# Patient Record
Sex: FEMALE | Race: BLACK OR AFRICAN AMERICAN | NOT HISPANIC OR LATINO | RURAL
[De-identification: names, ages, dates, MRNs, and addresses within clinical notes are randomized per-mention and may not be internally consistent; named-entity substitution may affect disease eponyms.]

---

## 2023-06-28 ENCOUNTER — OFFICE VISIT (OUTPATIENT)
Dept: FAMILY MEDICINE | Facility: CLINIC | Age: 38
End: 2023-06-28
Payer: COMMERCIAL

## 2023-06-28 VITALS
HEART RATE: 104 BPM | WEIGHT: 293 LBS | BODY MASS INDEX: 44.41 KG/M2 | SYSTOLIC BLOOD PRESSURE: 124 MMHG | TEMPERATURE: 99 F | DIASTOLIC BLOOD PRESSURE: 84 MMHG | RESPIRATION RATE: 18 BRPM | OXYGEN SATURATION: 98 % | HEIGHT: 68 IN

## 2023-06-28 DIAGNOSIS — G89.29 CHRONIC PAIN OF LEFT KNEE: ICD-10-CM

## 2023-06-28 DIAGNOSIS — M25.562 CHRONIC PAIN OF LEFT KNEE: ICD-10-CM

## 2023-06-28 DIAGNOSIS — Z13.1 SCREENING FOR DIABETES MELLITUS: ICD-10-CM

## 2023-06-28 DIAGNOSIS — F32.A DEPRESSION, UNSPECIFIED DEPRESSION TYPE: ICD-10-CM

## 2023-06-28 DIAGNOSIS — F41.9 ANXIETY: Primary | ICD-10-CM

## 2023-06-28 LAB — GLUCOSE SERPL-MCNC: 137 MG/DL (ref 70–110)

## 2023-06-28 PROCEDURE — 1159F PR MEDICATION LIST DOCUMENTED IN MEDICAL RECORD: ICD-10-PCS | Mod: ,,, | Performed by: NURSE PRACTITIONER

## 2023-06-28 PROCEDURE — 1160F RVW MEDS BY RX/DR IN RCRD: CPT | Mod: ,,, | Performed by: NURSE PRACTITIONER

## 2023-06-28 PROCEDURE — 1160F PR REVIEW ALL MEDS BY PRESCRIBER/CLIN PHARMACIST DOCUMENTED: ICD-10-PCS | Mod: ,,, | Performed by: NURSE PRACTITIONER

## 2023-06-28 PROCEDURE — 1159F MED LIST DOCD IN RCRD: CPT | Mod: ,,, | Performed by: NURSE PRACTITIONER

## 2023-06-28 PROCEDURE — 3008F PR BODY MASS INDEX (BMI) DOCUMENTED: ICD-10-PCS | Mod: ,,, | Performed by: NURSE PRACTITIONER

## 2023-06-28 PROCEDURE — 99203 OFFICE O/P NEW LOW 30 MIN: CPT | Mod: ,,, | Performed by: NURSE PRACTITIONER

## 2023-06-28 PROCEDURE — 3008F BODY MASS INDEX DOCD: CPT | Mod: ,,, | Performed by: NURSE PRACTITIONER

## 2023-06-28 PROCEDURE — 3079F DIAST BP 80-89 MM HG: CPT | Mod: ,,, | Performed by: NURSE PRACTITIONER

## 2023-06-28 PROCEDURE — 3074F PR MOST RECENT SYSTOLIC BLOOD PRESSURE < 130 MM HG: ICD-10-PCS | Mod: ,,, | Performed by: NURSE PRACTITIONER

## 2023-06-28 PROCEDURE — 3074F SYST BP LT 130 MM HG: CPT | Mod: ,,, | Performed by: NURSE PRACTITIONER

## 2023-06-28 PROCEDURE — 99203 PR OFFICE/OUTPT VISIT, NEW, LEVL III, 30-44 MIN: ICD-10-PCS | Mod: ,,, | Performed by: NURSE PRACTITIONER

## 2023-06-28 PROCEDURE — 3079F PR MOST RECENT DIASTOLIC BLOOD PRESSURE 80-89 MM HG: ICD-10-PCS | Mod: ,,, | Performed by: NURSE PRACTITIONER

## 2023-06-28 RX ORDER — HYDROXYZINE PAMOATE 25 MG/1
25 CAPSULE ORAL EVERY 8 HOURS PRN
Qty: 30 CAPSULE | Refills: 1 | Status: ON HOLD | OUTPATIENT
Start: 2023-06-28 | End: 2023-12-06 | Stop reason: ALTCHOICE

## 2023-06-28 RX ORDER — MELOXICAM 15 MG/1
15 TABLET ORAL DAILY PRN
Qty: 30 TABLET | Refills: 5 | Status: ON HOLD | OUTPATIENT
Start: 2023-06-28 | End: 2023-12-06 | Stop reason: ALTCHOICE

## 2023-06-28 NOTE — PROGRESS NOTES
Clinic Note    Daja Alfaro is a 38 y.o. female     Chief Complaint:   Chief Complaint   Patient presents with    Foot Swelling     Bilateral Feet swelling. Started Saturday.     Dizziness     Concerned about possible high blood pressure and wants to be checked for diabetes.     Knee Pain     Left knee pain 9/10.     Hand Pain     Right hand pain 7/10    Weight Loss        Subjective:    Patient has a few concerns today.  Patient reports this past week she was at a wedding out of town and noticed swelling to bilateral feet. Admits to being outside and up on feet. Admits to eating pork ribs. Patient states she felt dizzy at times at wedding. Dizziness would come and go. States resolved now. Patient states she took her mom's lasix one day and swelling resolved.   Patient complains of chronic pain to left knee. Denies acute injury. Denies significant swelling.  Patient states she would like to be screened for diabetes. Admits to strong family hx of diabetes. Admits to obesity. Denies eating abundance of food. States she snacks more so. Admits to exercise 2 days a week-walking.   Patient complains of anxiety and depression. Denies suicidal or homicidal ideation. Patient admits to a lot of deaths. Admits to grieving. Has lost 2 sibling and close friend. Admits to crying and worrying. Admits to difficulty sleeping. Patient agreeable to therapy.          Allergies:   Review of patient's allergies indicates:  No Known Allergies     Past Medical History:  History reviewed. No pertinent past medical history.     Current Medications:    Current Outpatient Medications:     mv-min/iron/folic/calcium/vitK (WOMEN'S MULTIVITAMIN ORAL), Take by mouth., Disp: , Rfl:     hydrOXYzine pamoate (VISTARIL) 25 MG Cap, Take 1 capsule (25 mg total) by mouth every 8 (eight) hours as needed., Disp: 30 capsule, Rfl: 1    meloxicam (MOBIC) 15 MG tablet, Take 1 tablet (15 mg total) by mouth daily as needed for Pain., Disp: 30 tablet, Rfl: 5  "      Review of Systems   Constitutional:  Negative for fever.   Respiratory:  Negative for cough and shortness of breath.    Cardiovascular:  Positive for leg swelling. Negative for chest pain.   Gastrointestinal:  Negative for abdominal pain, nausea and vomiting.   Endocrine: Negative for polydipsia, polyphagia and polyuria.   Genitourinary:  Negative for dysuria.   Musculoskeletal:  Positive for arthralgias. Negative for joint swelling.   Neurological:  Negative for headaches.   Psychiatric/Behavioral:  Positive for dysphoric mood and sleep disturbance. Negative for self-injury and suicidal ideas. The patient is nervous/anxious.         Objective:    /84 (BP Location: Left arm, Patient Position: Sitting, BP Method: Large (Automatic))   Pulse 104   Temp 99.2 °F (37.3 °C) (Oral)   Resp 18   Ht 5' 8" (1.727 m)   Wt (!) 145.2 kg (320 lb)   LMP 06/23/2023 (Exact Date)   SpO2 98%   BMI 48.66 kg/m²      Physical Exam  Constitutional:       Appearance: She is obese.   Eyes:      Extraocular Movements: Extraocular movements intact.   Cardiovascular:      Rate and Rhythm: Normal rate and regular rhythm.      Pulses: Normal pulses.      Heart sounds: Normal heart sounds.   Pulmonary:      Effort: Pulmonary effort is normal.      Breath sounds: Normal breath sounds.   Abdominal:      Palpations: Abdomen is soft.      Tenderness: There is no abdominal tenderness.   Musculoskeletal:      Left knee: No swelling. Normal range of motion. Tenderness present. No medial joint line tenderness.      Right lower leg: No edema.      Left lower leg: No edema.        Legs:    Skin:     General: Skin is warm and dry.   Neurological:      Mental Status: She is alert.   Psychiatric:         Mood and Affect: Affect is tearful.         Behavior: Behavior is cooperative.        Assessment and Plan:    1. Anxiety    2. Depression, unspecified depression type    3. Screening for diabetes mellitus    4. Chronic pain of left knee     "     Anxiety  -     hydrOXYzine pamoate (VISTARIL) 25 MG Cap; Take 1 capsule (25 mg total) by mouth every 8 (eight) hours as needed.  Dispense: 30 capsule; Refill: 1  -     Ambulatory referral/consult to Psychology; Future; Expected date: 07/05/2023  -discussed side effects and precautions  -refer to therapy. Patient agreeable    Depression, unspecified depression type  -     hydrOXYzine pamoate (VISTARIL) 25 MG Cap; Take 1 capsule (25 mg total) by mouth every 8 (eight) hours as needed.  Dispense: 30 capsule; Refill: 1  -     Ambulatory referral/consult to Psychology; Future; Expected date: 07/05/2023    Screening for diabetes mellitus  -     POCT glucose  -glucose 137 nonfasting. Instructed patient high side of normal.   -patient has follow up with pcp July 17th. Recommend hgba1c due to family hx and glucose    Chronic pain of left knee  -     meloxicam (MOBIC) 15 MG tablet; Take 1 tablet (15 mg total) by mouth daily as needed for Pain.  Dispense: 30 tablet; Refill: 5  -try for knee pain prn  -f/u if persist or worsen  -recommend weight loss        There are no Patient Instructions on file for this visit.   Follow up if symptoms worsen or fail to improve.

## 2023-06-28 NOTE — LETTER
June 28, 2023      [facility]  [address] [place] [address]  Phone: [phone]  Fax: [phone]       Patient: Daja Alfaro   YOB: 1985  Date of Visit: 06/28/2023    To Whom It May Concern:    Mckenzie Alfaro  was at [facility] on 06/28/2023. The patient may return to work/school on 06/29/2023. If you have any questions or concerns, or if I can be of further assistance, please do not hesitate to contact me.    Sincerely,    Sushant Wolf MA

## 2023-10-17 ENCOUNTER — OFFICE VISIT (OUTPATIENT)
Dept: OBSTETRICS AND GYNECOLOGY | Facility: CLINIC | Age: 38
End: 2023-10-17
Payer: COMMERCIAL

## 2023-10-17 VITALS
HEART RATE: 89 BPM | DIASTOLIC BLOOD PRESSURE: 86 MMHG | SYSTOLIC BLOOD PRESSURE: 134 MMHG | WEIGHT: 293 LBS | BODY MASS INDEX: 47.47 KG/M2

## 2023-10-17 DIAGNOSIS — R10.2 PELVIC PAIN: Primary | ICD-10-CM

## 2023-10-17 DIAGNOSIS — N85.2 ENLARGED UTERUS: ICD-10-CM

## 2023-10-17 DIAGNOSIS — N92.0 MENORRHAGIA WITH REGULAR CYCLE: ICD-10-CM

## 2023-10-17 DIAGNOSIS — N83.201 CYST OF RIGHT OVARY: ICD-10-CM

## 2023-10-17 LAB
BASOPHILS # BLD AUTO: 0.03 K/UL (ref 0–0.2)
BASOPHILS NFR BLD AUTO: 0.3 % (ref 0–1)
DIFFERENTIAL METHOD BLD: ABNORMAL
EOSINOPHIL # BLD AUTO: 0.05 K/UL (ref 0–0.5)
EOSINOPHIL NFR BLD AUTO: 0.5 % (ref 1–4)
ERYTHROCYTE [DISTWIDTH] IN BLOOD BY AUTOMATED COUNT: 16.1 % (ref 11.5–14.5)
FSH SERPL-ACNC: 2 MIU/ML (ref 1.7–134.8)
HCT VFR BLD AUTO: 39.7 % (ref 38–47)
HGB BLD-MCNC: 12.2 G/DL (ref 12–16)
IMM GRANULOCYTES # BLD AUTO: 0.02 K/UL (ref 0–0.04)
IMM GRANULOCYTES NFR BLD: 0.2 % (ref 0–0.4)
LH SERPL-ACNC: 0.9 MIU/ML
LYMPHOCYTES # BLD AUTO: 2.3 K/UL (ref 1–4.8)
LYMPHOCYTES NFR BLD AUTO: 23.9 % (ref 27–41)
MCH RBC QN AUTO: 24.8 PG (ref 27–31)
MCHC RBC AUTO-ENTMCNC: 30.7 G/DL (ref 32–36)
MCV RBC AUTO: 80.9 FL (ref 80–96)
MONOCYTES # BLD AUTO: 0.43 K/UL (ref 0–0.8)
MONOCYTES NFR BLD AUTO: 4.5 % (ref 2–6)
MPC BLD CALC-MCNC: 10.1 FL (ref 9.4–12.4)
NEUTROPHILS # BLD AUTO: 6.8 K/UL (ref 1.8–7.7)
NEUTROPHILS NFR BLD AUTO: 70.6 % (ref 53–65)
NRBC # BLD AUTO: 0 X10E3/UL
NRBC, AUTO (.00): 0 %
PLATELET # BLD AUTO: 319 K/UL (ref 150–400)
RBC # BLD AUTO: 4.91 M/UL (ref 4.2–5.4)
TESTOST SERPL-MCNC: 10 NG/DL (ref 8–60)
TSH SERPL DL<=0.005 MIU/L-ACNC: 0.7 UIU/ML (ref 0.36–3.74)
WBC # BLD AUTO: 9.63 K/UL (ref 4.5–11)

## 2023-10-17 PROCEDURE — 1159F MED LIST DOCD IN RCRD: CPT | Mod: ,,, | Performed by: OBSTETRICS & GYNECOLOGY

## 2023-10-17 PROCEDURE — 3079F PR MOST RECENT DIASTOLIC BLOOD PRESSURE 80-89 MM HG: ICD-10-PCS | Mod: ,,, | Performed by: OBSTETRICS & GYNECOLOGY

## 2023-10-17 PROCEDURE — 3079F DIAST BP 80-89 MM HG: CPT | Mod: ,,, | Performed by: OBSTETRICS & GYNECOLOGY

## 2023-10-17 PROCEDURE — 84443 TSH: ICD-10-PCS | Mod: ,,, | Performed by: CLINICAL MEDICAL LABORATORY

## 2023-10-17 PROCEDURE — 99203 OFFICE O/P NEW LOW 30 MIN: CPT | Mod: ,,, | Performed by: OBSTETRICS & GYNECOLOGY

## 2023-10-17 PROCEDURE — 85025 COMPLETE CBC W/AUTO DIFF WBC: CPT | Mod: ,,, | Performed by: CLINICAL MEDICAL LABORATORY

## 2023-10-17 PROCEDURE — 83002 LUTEINIZING HORMONE: ICD-10-PCS | Mod: ,,, | Performed by: CLINICAL MEDICAL LABORATORY

## 2023-10-17 PROCEDURE — 83001 ASSAY OF GONADOTROPIN (FSH): CPT | Mod: ,,, | Performed by: CLINICAL MEDICAL LABORATORY

## 2023-10-17 PROCEDURE — 3075F SYST BP GE 130 - 139MM HG: CPT | Mod: ,,, | Performed by: OBSTETRICS & GYNECOLOGY

## 2023-10-17 PROCEDURE — 83001 FOLLICLE STIMULATING HORMONE: ICD-10-PCS | Mod: ,,, | Performed by: CLINICAL MEDICAL LABORATORY

## 2023-10-17 PROCEDURE — 3008F BODY MASS INDEX DOCD: CPT | Mod: ,,, | Performed by: OBSTETRICS & GYNECOLOGY

## 2023-10-17 PROCEDURE — 36415 COLL VENOUS BLD VENIPUNCTURE: CPT | Mod: ,,, | Performed by: OBSTETRICS & GYNECOLOGY

## 2023-10-17 PROCEDURE — 99203 PR OFFICE/OUTPT VISIT, NEW, LEVL III, 30-44 MIN: ICD-10-PCS | Mod: ,,, | Performed by: OBSTETRICS & GYNECOLOGY

## 2023-10-17 PROCEDURE — 84443 ASSAY THYROID STIM HORMONE: CPT | Mod: ,,, | Performed by: CLINICAL MEDICAL LABORATORY

## 2023-10-17 PROCEDURE — 3075F PR MOST RECENT SYSTOLIC BLOOD PRESS GE 130-139MM HG: ICD-10-PCS | Mod: ,,, | Performed by: OBSTETRICS & GYNECOLOGY

## 2023-10-17 PROCEDURE — 84403 ASSAY OF TOTAL TESTOSTERONE: CPT | Mod: ,,, | Performed by: CLINICAL MEDICAL LABORATORY

## 2023-10-17 PROCEDURE — 83002 ASSAY OF GONADOTROPIN (LH): CPT | Mod: ,,, | Performed by: CLINICAL MEDICAL LABORATORY

## 2023-10-17 PROCEDURE — 1160F PR REVIEW ALL MEDS BY PRESCRIBER/CLIN PHARMACIST DOCUMENTED: ICD-10-PCS | Mod: ,,, | Performed by: OBSTETRICS & GYNECOLOGY

## 2023-10-17 PROCEDURE — 85025 CBC WITH DIFFERENTIAL: ICD-10-PCS | Mod: ,,, | Performed by: CLINICAL MEDICAL LABORATORY

## 2023-10-17 PROCEDURE — 36415 PR COLLECTION VENOUS BLOOD,VENIPUNCTURE: ICD-10-PCS | Mod: ,,, | Performed by: OBSTETRICS & GYNECOLOGY

## 2023-10-17 PROCEDURE — 1159F PR MEDICATION LIST DOCUMENTED IN MEDICAL RECORD: ICD-10-PCS | Mod: ,,, | Performed by: OBSTETRICS & GYNECOLOGY

## 2023-10-17 PROCEDURE — 3008F PR BODY MASS INDEX (BMI) DOCUMENTED: ICD-10-PCS | Mod: ,,, | Performed by: OBSTETRICS & GYNECOLOGY

## 2023-10-17 PROCEDURE — 1160F RVW MEDS BY RX/DR IN RCRD: CPT | Mod: ,,, | Performed by: OBSTETRICS & GYNECOLOGY

## 2023-10-17 PROCEDURE — 84403 TESTOSTERONE: ICD-10-PCS | Mod: ,,, | Performed by: CLINICAL MEDICAL LABORATORY

## 2023-10-17 RX ORDER — FERROUS SULFATE TAB 325 MG (65 MG ELEMENTAL FE) 325 (65 FE) MG
TAB ORAL 2 TIMES DAILY
COMMUNITY
Start: 2023-08-25

## 2023-10-17 RX ORDER — PREDNISONE 10 MG/1
10 TABLET ORAL
Status: ON HOLD | COMMUNITY
Start: 2023-10-06 | End: 2023-12-06 | Stop reason: ALTCHOICE

## 2023-10-17 NOTE — PROGRESS NOTES
History & Physical    SUBJECTIVE:     History of Present Illness:  Patient is a 38 y.o. female presents with a referral from  due to an ovarian cyst. Onset of symptoms was gradual starting 1 year ago with unchanged course since that time. Patient denies pelvic pain with intercourse. Symptoms are aggravated by cycles. Symptoms improve with taking Aleve. Pt states she is having right sided pelvic pain and rates pain a 9-10 on a 0-10 scale. Reviewed US from [facility]. Cycles last 3-5 days and uses 6 pads or tampons in a day when on her cycle.  Pt has been on birth control in the past without resolution or improvement of her symptoms.  Chief Complaint   Patient presents with    Ovarian Cyst     New Patient       Review of patient's allergies indicates:  No Known Allergies    Current Outpatient Medications   Medication Sig Dispense Refill    FEROSUL 325 mg (65 mg iron) Tab tablet Take by mouth 3 (three) times daily.      mv-min/iron/folic/calcium/vitK (WOMEN'S MULTIVITAMIN ORAL) Take by mouth.      predniSONE (DELTASONE) 10 MG tablet Take 10 mg by mouth.      hydrOXYzine pamoate (VISTARIL) 25 MG Cap Take 1 capsule (25 mg total) by mouth every 8 (eight) hours as needed. (Patient not taking: Reported on 10/17/2023) 30 capsule 1    meloxicam (MOBIC) 15 MG tablet Take 1 tablet (15 mg total) by mouth daily as needed for Pain. (Patient not taking: Reported on 10/17/2023) 30 tablet 5     No current facility-administered medications for this visit.       History reviewed. No pertinent past medical history.  Past Surgical History:   Procedure Laterality Date    EXTERNAL EAR SURGERY Right     HAND SURGERY Right     TUBAL LIGATION       Family History   Problem Relation Age of Onset    Diabetes Mellitus Mother     Diabetes Mellitus Father     Hypertension Maternal Grandfather     Diabetes Paternal Grandmother     Hypertension Paternal Grandfather      Social History     Tobacco Use    Smoking status: Former     Types: Cigars      Passive exposure: Current    Smokeless tobacco: Never   Substance Use Topics    Alcohol use: Yes     Alcohol/week: 2.0 standard drinks of alcohol     Types: 2 Glasses of wine per week    Drug use: Not Currently        Review of Systems:  Review of Systems   Constitutional:  Negative for appetite change, chills, fatigue and fever.   HENT: Negative.     Eyes: Negative.    Respiratory:  Negative for cough, chest tightness and shortness of breath.    Cardiovascular:  Negative for chest pain, palpitations and leg swelling.   Gastrointestinal:  Negative for abdominal distention, abdominal pain, blood in stool, constipation, diarrhea, nausea and vomiting.   Endocrine: Negative for cold intolerance, heat intolerance, polydipsia, polyphagia and polyuria.   Genitourinary:  Positive for menstrual problem (menorrhagia) and pelvic pain. Negative for difficulty urinating, dyspareunia, dysuria, flank pain, frequency, urgency, vaginal bleeding, vaginal discharge and vaginal pain.   Musculoskeletal: Negative.    Skin: Negative.    Neurological: Negative.    Psychiatric/Behavioral: Negative.  Negative for agitation, behavioral problems, confusion and sleep disturbance. The patient is not nervous/anxious.        OBJECTIVE:     Vital Signs (Most Recent)  Pulse: 89 (10/17/23 1045)  BP: 134/86 (10/17/23 1045)     (!) 141.6 kg (312 lb 3.2 oz)     Physical Exam:  Physical Exam  Vitals reviewed. Exam conducted with a chaperone present.   Constitutional:       Appearance: Normal appearance.   HENT:      Head: Normocephalic and atraumatic.      Mouth/Throat:      Mouth: Mucous membranes are moist.   Eyes:      Extraocular Movements: Extraocular movements intact.      Pupils: Pupils are equal, round, and reactive to light.   Cardiovascular:      Rate and Rhythm: Normal rate and regular rhythm.      Pulses: Normal pulses.      Heart sounds: Normal heart sounds.   Pulmonary:      Effort: Pulmonary effort is normal.      Breath sounds: Normal  breath sounds.   Abdominal:      General: Abdomen is flat. Bowel sounds are normal.      Palpations: Abdomen is soft.   Musculoskeletal:         General: Normal range of motion.      Cervical back: Normal range of motion.   Skin:     General: Skin is warm and dry.   Neurological:      General: No focal deficit present.      Mental Status: She is alert and oriented to person, place, and time.   Psychiatric:         Mood and Affect: Mood normal.         Behavior: Behavior normal.         Thought Content: Thought content normal.         Judgment: Judgment normal.           Diagnostic Results:  Labs: Reviewed  US: Reviewed       ASSESSMENT/PLAN:   Daja was seen today for ovarian cyst.    Diagnoses and all orders for this visit:    Pelvic pain  -     Case Request Operating Room: HYSTERECTOMY,TOTAL,LAPAROSCOPIC,WITH SALPINGO-OOPHORECTOMY    Enlarged uterus  -     Case Request Operating Room: HYSTERECTOMY,TOTAL,LAPAROSCOPIC,WITH SALPINGO-OOPHORECTOMY    Menorrhagia with regular cycle  -     Follicle Stimulating Hormone; Future  -     Luteinizing Hormone; Future  -     TSH; Future  -     CBC Auto Differential; Future  -     Testosterone; Future  -     Case Request Operating Room: HYSTERECTOMY,TOTAL,LAPAROSCOPIC,WITH SALPINGO-OOPHORECTOMY  -     Follicle Stimulating Hormone  -     Luteinizing Hormone  -     TSH  -     CBC Auto Differential  -     Testosterone    Cyst of right ovary  -     Case Request Operating Room: HYSTERECTOMY,TOTAL,LAPAROSCOPIC,WITH SALPINGO-OOPHORECTOMY          PLAN:Plan   Treatment options reviewed with the pt both conservative and interventional. Pt has been on birth control in the past without improvement.    Pt wishes to proceed with hysterectomy with right oophorectomy on 12/06/2023.

## 2023-10-25 ENCOUNTER — TELEPHONE (OUTPATIENT)
Dept: OBSTETRICS AND GYNECOLOGY | Facility: CLINIC | Age: 38
End: 2023-10-25
Payer: COMMERCIAL

## 2023-10-25 NOTE — LETTER
October 25, 2023      [facility]  [address] [employer] [address]  Phone: [phone]  Fax: [phone]       Patient: Daja Alfaro   YOB: 1985  Date of Visit: 10/25/2023    To Whom It May Concern:    Mckenzie Alfaro  was at [facility] and is scheduled to have surgery on 12/06/2023. The patient will have an appointment on 12/04/2023 and may return to work/school on 01/17/2024 with no restrictions. If you have any questions or concerns, or if I can be of further assistance, please do not hesitate to contact me.    Sincerely,    Patsy Savage LPN

## 2023-10-25 NOTE — TELEPHONE ENCOUNTER
----- Message from Kourtney Wallace MA sent at 10/23/2023  8:40 AM CDT -----  Letter needs to be sent to [facility]-   Attn: Luli    She needs letter stating she is having surgery and how long to be out before they give her fmla paper  Call [phone] office #    Fax: [phone]

## 2023-11-03 NOTE — TELEPHONE ENCOUNTER
Attempted to contact to to verbalize request/ Pt has an appt on 11/08/2023 and letter can be given then. Letter was placed in [employer]

## 2023-11-08 ENCOUNTER — PROCEDURE VISIT (OUTPATIENT)
Dept: OBSTETRICS AND GYNECOLOGY | Facility: CLINIC | Age: 38
End: 2023-11-08
Payer: COMMERCIAL

## 2023-11-08 VITALS
DIASTOLIC BLOOD PRESSURE: 84 MMHG | BODY MASS INDEX: 47.87 KG/M2 | WEIGHT: 293 LBS | HEART RATE: 84 BPM | SYSTOLIC BLOOD PRESSURE: 122 MMHG

## 2023-11-08 DIAGNOSIS — Z01.419 ROUTINE GYNECOLOGICAL EXAMINATION: Primary | ICD-10-CM

## 2023-11-08 DIAGNOSIS — N83.201 CYST OF RIGHT OVARY: ICD-10-CM

## 2023-11-08 DIAGNOSIS — N85.2 ENLARGED UTERUS: ICD-10-CM

## 2023-11-08 DIAGNOSIS — Z12.4 CERVICAL CANCER SCREENING: ICD-10-CM

## 2023-11-08 DIAGNOSIS — N92.0 MENORRHAGIA WITH REGULAR CYCLE: ICD-10-CM

## 2023-11-08 LAB
B-HCG UR QL: NEGATIVE
CTP QC/QA: YES

## 2023-11-08 PROCEDURE — 81025 URINE PREGNANCY TEST: CPT | Mod: QW,,, | Performed by: OBSTETRICS & GYNECOLOGY

## 2023-11-08 PROCEDURE — 58558 PR HYSTEROSCOPY,W/ENDO BX: ICD-10-PCS | Mod: ,,, | Performed by: OBSTETRICS & GYNECOLOGY

## 2023-11-08 PROCEDURE — 81025 POCT URINE PREGNANCY: ICD-10-PCS | Mod: QW,,, | Performed by: OBSTETRICS & GYNECOLOGY

## 2023-11-08 PROCEDURE — 88142 CYTOPATH C/V THIN LAYER: CPT | Mod: TC,GCY | Performed by: OBSTETRICS & GYNECOLOGY

## 2023-11-08 PROCEDURE — 58558 HYSTEROSCOPY BIOPSY: CPT | Mod: ,,, | Performed by: OBSTETRICS & GYNECOLOGY

## 2023-11-08 PROCEDURE — 88305 TISSUE EXAM BY PATHOLOGIST: CPT | Mod: TC,SUR | Performed by: OBSTETRICS & GYNECOLOGY

## 2023-11-08 PROCEDURE — 87624 HPV HI-RISK TYP POOLED RSLT: CPT | Mod: ,,, | Performed by: CLINICAL MEDICAL LABORATORY

## 2023-11-08 PROCEDURE — 87624 HUMAN PAPILLOMAVIRUS (HPV): ICD-10-PCS | Mod: ,,, | Performed by: CLINICAL MEDICAL LABORATORY

## 2023-11-08 PROCEDURE — 99395 PR PREVENTIVE VISIT,EST,18-39: ICD-10-PCS | Mod: 25,,, | Performed by: OBSTETRICS & GYNECOLOGY

## 2023-11-08 PROCEDURE — 88305 TISSUE EXAM BY PATHOLOGIST: CPT | Mod: 26,,, | Performed by: PATHOLOGY

## 2023-11-08 PROCEDURE — 99395 PREV VISIT EST AGE 18-39: CPT | Mod: 25,,, | Performed by: OBSTETRICS & GYNECOLOGY

## 2023-11-08 PROCEDURE — 88305 SURGICAL PATHOLOGY: ICD-10-PCS | Mod: 26,,, | Performed by: PATHOLOGY

## 2023-11-08 NOTE — PROCEDURES
Procedures  Hysteorscopy, EMB Procedure Note:    Following informed written consent, the pt was placed in the lithotomy position. The speculum was inserted into the vagina and the cervix visualized with ease. The cervix was cleaned with betadine.  The uterus was sounded to 10 cm with the uterine sound. The hysteroscope was then inserted into the cervical os and the uterine cavity was directly visualized, bilateral ostea were noted at that time. Findings no lesions, polyps or fibroids seen. The hysteroscope was then removed and a biopsy of the endometrium performed with a pipelle. Specimen sent to pathology for evaluation. The single tooth tenaculum was then removed and the cervix was made hemostatic with pressure.

## 2023-11-08 NOTE — H&P
CC: Well woman exam    Daja Alfaro is a 38 y.o. female  presents for well woman exam.    LMP: Patient's last menstrual period was 10/27/2023 (approximate)..    Last mammogram: na  Last Colonoscopy: na  Heavy menses    History reviewed. No pertinent past medical history.  Past Surgical History:   Procedure Laterality Date    EXTERNAL EAR SURGERY Right     HAND SURGERY Right     TUBAL LIGATION       Social History     Socioeconomic History    Marital status: Significant Other   Tobacco Use    Smoking status: Former     Types: Cigars     Passive exposure: Current    Smokeless tobacco: Never   Substance and Sexual Activity    Alcohol use: Yes     Alcohol/week: 2.0 standard drinks of alcohol     Types: 2 Glasses of wine per week    Drug use: Not Currently    Sexual activity: Yes     Family History   Problem Relation Age of Onset    Diabetes Mellitus Mother     Diabetes Mellitus Father     Hypertension Maternal Grandfather     Diabetes Paternal Grandmother     Hypertension Paternal Grandfather      OB History          4    Para        Term                AB        Living   4         SAB        IAB        Ectopic        Multiple        Live Births                     /84   Pulse 84   Wt (!) 142.8 kg (314 lb 12.8 oz)   LMP 10/27/2023 (Approximate)   BMI 47.87 kg/m²       ROS:  GENERAL: Denies weight gain or weight loss. Feeling well overall.   SKIN: Denies rash or lesions.   HEAD: Denies head injury or headache.   NODES: Denies enlarged lymph nodes.   CHEST: Denies chest pain or shortness of breath.   CARDIOVASCULAR: Denies palpitations or left sided chest pain.   ABDOMEN: No abdominal pain, constipation, diarrhea, nausea, vomiting or rectal bleeding.   URINARY: No frequency, dysuria, hematuria, or burning on urination.  REPRODUCTIVE: See HPI.   BREASTS: The patient performs breast self-examination and denies pain, lumps, or nipple discharge.   HEMATOLOGIC: No easy bruisability or excessive  bleeding.   MUSCULOSKELETAL: Denies joint pain or swelling.   NEUROLOGIC: Denies syncope or weakness.   PSYCHIATRIC: Denies depression, anxiety or mood swings.    PHYSICAL EXAM:  APPEARANCE: Well nourished, well developed, in no acute distress.  AFFECT: WNL, alert and oriented x 3  SKIN: No acne or hirsutism  NECK: Neck symmetric without masses or thyromegaly  NODES: No inguinal, cervical, axillary, or femoral lymph node enlargement  CHEST: Good respiratory effect  ABDOMEN: Soft.  No tenderness or masses.  No hepatosplenomegaly.  No hernias.  BREASTS: Symmetrical, no skin changes or visible lesions.  No palpable masses, nipple discharge bilaterally.  PELVIC: Normal external genitalia without lesions.  Normal hair distribution.  Adequate perineal body, normal urethral meatus.  Vagina moist and well rugated without lesions or discharge.  Cervix pink, without lesions, discharge or tenderness.  No significant cystocele or rectocele.  Bimanual exam shows uterus to be normal size, regular, mobile and nontender.  Adnexa without masses or tenderness.    EXTREMITIES: No edema.    Routine gynecological examination  -     ThinPrep Pap Test; Future; Expected date: 11/08/2023    Cervical cancer screening  -     ThinPrep Pap Test; Future; Expected date: 11/08/2023    Menorrhagia with regular cycle  -     POCT urine pregnancy  -     Surgical Pathology    Enlarged uterus    Cyst of right ovary            Patient was counseled today on A.C.S. Pap guidelines and recommendations for yearly pelvic exams, mammograms and monthly self breast exams; to see her PCP for other health maintenance.   Exercise regimen encouraged  Healthy food choices encouraged  Questions answered to desired level of satisfaction  Verbalized understanding to all information and instructions    Follow up in about 1 year (around 11/8/2024) for Annual Exam.      Jeremiah Valdivia M.D., FCOG    OB/GYN

## 2023-11-10 LAB
ESTROGEN SERPL-MCNC: NORMAL PG/ML
INSULIN SERPL-ACNC: NORMAL U[IU]/ML
LAB AP CLINICAL INFORMATION: NORMAL
LAB AP GROSS DESCRIPTION: NORMAL
LAB AP LABORATORY NOTES: NORMAL
T3RU NFR SERPL: NORMAL %

## 2023-11-13 LAB
GH SERPL-MCNC: NORMAL NG/ML
INSULIN SERPL-ACNC: NORMAL U[IU]/ML
LAB AP CLINICAL INFORMATION: NORMAL
LAB AP GYN INTERPRETATION: NEGATIVE
LAB AP PAP DISCLAIMER COMMENTS: NORMAL
RENIN PLAS-CCNC: NORMAL NG/ML/H

## 2023-11-16 LAB
HPV 16: NEGATIVE
HPV 18: NEGATIVE
HPV OTHER: NEGATIVE

## 2023-12-04 ENCOUNTER — OFFICE VISIT (OUTPATIENT)
Dept: OBSTETRICS AND GYNECOLOGY | Facility: CLINIC | Age: 38
End: 2023-12-04
Payer: COMMERCIAL

## 2023-12-04 VITALS
HEART RATE: 105 BPM | WEIGHT: 293 LBS | SYSTOLIC BLOOD PRESSURE: 130 MMHG | DIASTOLIC BLOOD PRESSURE: 85 MMHG | BODY MASS INDEX: 47.5 KG/M2

## 2023-12-04 DIAGNOSIS — R10.2 PELVIC PAIN: ICD-10-CM

## 2023-12-04 DIAGNOSIS — Z01.818 PRE-OP TESTING: ICD-10-CM

## 2023-12-04 DIAGNOSIS — N83.201 CYST OF RIGHT OVARY: ICD-10-CM

## 2023-12-04 DIAGNOSIS — N85.2 ENLARGED UTERUS: ICD-10-CM

## 2023-12-04 DIAGNOSIS — N92.0 MENORRHAGIA WITH REGULAR CYCLE: Primary | ICD-10-CM

## 2023-12-04 LAB
ALBUMIN SERPL BCP-MCNC: 3.6 G/DL (ref 3.5–5)
ALBUMIN/GLOB SERPL: 1.1 {RATIO}
ALP SERPL-CCNC: 65 U/L (ref 37–98)
ALT SERPL W P-5'-P-CCNC: 19 U/L (ref 13–56)
ANION GAP SERPL CALCULATED.3IONS-SCNC: 11 MMOL/L (ref 7–16)
AST SERPL W P-5'-P-CCNC: 14 U/L (ref 15–37)
BACTERIA #/AREA URNS HPF: ABNORMAL /HPF
BASOPHILS # BLD AUTO: 0.03 K/UL (ref 0–0.2)
BASOPHILS NFR BLD AUTO: 0.4 % (ref 0–1)
BILIRUB SERPL-MCNC: 0.6 MG/DL (ref ?–1.2)
BILIRUB UR QL STRIP: NEGATIVE
BUN SERPL-MCNC: 7 MG/DL (ref 7–18)
BUN/CREAT SERPL: 8 (ref 6–20)
CALCIUM SERPL-MCNC: 8.9 MG/DL (ref 8.5–10.1)
CHLORIDE SERPL-SCNC: 106 MMOL/L (ref 98–107)
CLARITY UR: ABNORMAL
CO2 SERPL-SCNC: 28 MMOL/L (ref 21–32)
COLOR UR: ABNORMAL
CREAT SERPL-MCNC: 0.86 MG/DL (ref 0.55–1.02)
DIFFERENTIAL METHOD BLD: ABNORMAL
EGFR (NO RACE VARIABLE) (RUSH/TITUS): 89 ML/MIN/1.73M2
EOSINOPHIL # BLD AUTO: 0.07 K/UL (ref 0–0.5)
EOSINOPHIL NFR BLD AUTO: 0.9 % (ref 1–4)
ERYTHROCYTE [DISTWIDTH] IN BLOOD BY AUTOMATED COUNT: 14.9 % (ref 11.5–14.5)
GLOBULIN SER-MCNC: 3.3 G/DL (ref 2–4)
GLUCOSE SERPL-MCNC: 100 MG/DL (ref 74–106)
GLUCOSE UR STRIP-MCNC: NORMAL MG/DL
HCT VFR BLD AUTO: 43.2 % (ref 38–47)
HGB BLD-MCNC: 13.1 G/DL (ref 12–16)
IMM GRANULOCYTES # BLD AUTO: 0.02 K/UL (ref 0–0.04)
IMM GRANULOCYTES NFR BLD: 0.3 % (ref 0–0.4)
KETONES UR STRIP-SCNC: NEGATIVE MG/DL
LEUKOCYTE ESTERASE UR QL STRIP: ABNORMAL
LYMPHOCYTES # BLD AUTO: 1.68 K/UL (ref 1–4.8)
LYMPHOCYTES NFR BLD AUTO: 21.7 % (ref 27–41)
MCH RBC QN AUTO: 26.4 PG (ref 27–31)
MCHC RBC AUTO-ENTMCNC: 30.3 G/DL (ref 32–36)
MCV RBC AUTO: 87.1 FL (ref 80–96)
MONOCYTES # BLD AUTO: 0.35 K/UL (ref 0–0.8)
MONOCYTES NFR BLD AUTO: 4.5 % (ref 2–6)
MPC BLD CALC-MCNC: 10.5 FL (ref 9.4–12.4)
MUCOUS, UA: ABNORMAL /LPF
NEUTROPHILS # BLD AUTO: 5.6 K/UL (ref 1.8–7.7)
NEUTROPHILS NFR BLD AUTO: 72.2 % (ref 53–65)
NITRITE UR QL STRIP: NEGATIVE
NRBC # BLD AUTO: 0 X10E3/UL
NRBC, AUTO (.00): 0 %
PH UR STRIP: 5.5 PH UNITS
PLATELET # BLD AUTO: 336 K/UL (ref 150–400)
POTASSIUM SERPL-SCNC: 3.6 MMOL/L (ref 3.5–5.1)
PROT SERPL-MCNC: 6.9 G/DL (ref 6.4–8.2)
PROT UR QL STRIP: 10
RBC # BLD AUTO: 4.96 M/UL (ref 4.2–5.4)
RBC # UR STRIP: ABNORMAL /UL
RBC #/AREA URNS HPF: 11 /HPF
SODIUM SERPL-SCNC: 141 MMOL/L (ref 136–145)
SP GR UR STRIP: 1.02
SQUAMOUS #/AREA URNS LPF: ABNORMAL /HPF
UROBILINOGEN UR STRIP-ACNC: NORMAL MG/DL
WBC # BLD AUTO: 7.75 K/UL (ref 4.5–11)
WBC #/AREA URNS HPF: 98 /HPF
WBC CLUMPS, UA: ABNORMAL /HPF
YEAST #/AREA URNS HPF: ABNORMAL /HPF

## 2023-12-04 PROCEDURE — 36415 PR COLLECTION VENOUS BLOOD,VENIPUNCTURE: ICD-10-PCS | Mod: ,,, | Performed by: OBSTETRICS & GYNECOLOGY

## 2023-12-04 PROCEDURE — 86900 BLOOD TYPING SEROLOGIC ABO: CPT | Mod: ,,, | Performed by: CLINICAL MEDICAL LABORATORY

## 2023-12-04 PROCEDURE — 86850 RBC ANTIBODY SCREEN: CPT | Mod: ,,, | Performed by: CLINICAL MEDICAL LABORATORY

## 2023-12-04 PROCEDURE — 85025 COMPLETE CBC W/AUTO DIFF WBC: CPT | Mod: ,,, | Performed by: CLINICAL MEDICAL LABORATORY

## 2023-12-04 PROCEDURE — 86900 TYPE & SCREEN: ICD-10-PCS | Mod: ,,, | Performed by: CLINICAL MEDICAL LABORATORY

## 2023-12-04 PROCEDURE — 1159F PR MEDICATION LIST DOCUMENTED IN MEDICAL RECORD: ICD-10-PCS | Mod: ,,, | Performed by: OBSTETRICS & GYNECOLOGY

## 2023-12-04 PROCEDURE — 3079F DIAST BP 80-89 MM HG: CPT | Mod: ,,, | Performed by: OBSTETRICS & GYNECOLOGY

## 2023-12-04 PROCEDURE — 80053 COMPREHENSIVE METABOLIC PANEL: ICD-10-PCS | Mod: ,,, | Performed by: CLINICAL MEDICAL LABORATORY

## 2023-12-04 PROCEDURE — 81001 URINALYSIS: ICD-10-PCS | Mod: ,,, | Performed by: CLINICAL MEDICAL LABORATORY

## 2023-12-04 PROCEDURE — 81001 URINALYSIS AUTO W/SCOPE: CPT | Mod: ,,, | Performed by: CLINICAL MEDICAL LABORATORY

## 2023-12-04 PROCEDURE — 86901 TYPE & SCREEN: ICD-10-PCS | Mod: ,,, | Performed by: CLINICAL MEDICAL LABORATORY

## 2023-12-04 PROCEDURE — 86901 BLOOD TYPING SEROLOGIC RH(D): CPT | Mod: ,,, | Performed by: CLINICAL MEDICAL LABORATORY

## 2023-12-04 PROCEDURE — 99024 PR POST-OP FOLLOW-UP VISIT: ICD-10-PCS | Mod: ,,, | Performed by: OBSTETRICS & GYNECOLOGY

## 2023-12-04 PROCEDURE — 85025 CBC WITH DIFFERENTIAL: ICD-10-PCS | Mod: ,,, | Performed by: CLINICAL MEDICAL LABORATORY

## 2023-12-04 PROCEDURE — 86850 TYPE & SCREEN: ICD-10-PCS | Mod: ,,, | Performed by: CLINICAL MEDICAL LABORATORY

## 2023-12-04 PROCEDURE — 1160F RVW MEDS BY RX/DR IN RCRD: CPT | Mod: ,,, | Performed by: OBSTETRICS & GYNECOLOGY

## 2023-12-04 PROCEDURE — 87086 URINE CULTURE/COLONY COUNT: CPT | Mod: ,,, | Performed by: CLINICAL MEDICAL LABORATORY

## 2023-12-04 PROCEDURE — 1159F MED LIST DOCD IN RCRD: CPT | Mod: ,,, | Performed by: OBSTETRICS & GYNECOLOGY

## 2023-12-04 PROCEDURE — 3008F BODY MASS INDEX DOCD: CPT | Mod: ,,, | Performed by: OBSTETRICS & GYNECOLOGY

## 2023-12-04 PROCEDURE — 36415 COLL VENOUS BLD VENIPUNCTURE: CPT | Mod: ,,, | Performed by: OBSTETRICS & GYNECOLOGY

## 2023-12-04 PROCEDURE — 3008F PR BODY MASS INDEX (BMI) DOCUMENTED: ICD-10-PCS | Mod: ,,, | Performed by: OBSTETRICS & GYNECOLOGY

## 2023-12-04 PROCEDURE — 87086 CULTURE, URINE: ICD-10-PCS | Mod: ,,, | Performed by: CLINICAL MEDICAL LABORATORY

## 2023-12-04 PROCEDURE — 1160F PR REVIEW ALL MEDS BY PRESCRIBER/CLIN PHARMACIST DOCUMENTED: ICD-10-PCS | Mod: ,,, | Performed by: OBSTETRICS & GYNECOLOGY

## 2023-12-04 PROCEDURE — 3079F PR MOST RECENT DIASTOLIC BLOOD PRESSURE 80-89 MM HG: ICD-10-PCS | Mod: ,,, | Performed by: OBSTETRICS & GYNECOLOGY

## 2023-12-04 PROCEDURE — 3075F SYST BP GE 130 - 139MM HG: CPT | Mod: ,,, | Performed by: OBSTETRICS & GYNECOLOGY

## 2023-12-04 PROCEDURE — 80053 COMPREHEN METABOLIC PANEL: CPT | Mod: ,,, | Performed by: CLINICAL MEDICAL LABORATORY

## 2023-12-04 PROCEDURE — 99024 POSTOP FOLLOW-UP VISIT: CPT | Mod: ,,, | Performed by: OBSTETRICS & GYNECOLOGY

## 2023-12-04 PROCEDURE — 3075F PR MOST RECENT SYSTOLIC BLOOD PRESS GE 130-139MM HG: ICD-10-PCS | Mod: ,,, | Performed by: OBSTETRICS & GYNECOLOGY

## 2023-12-04 NOTE — H&P (VIEW-ONLY)
History & Physical    SUBJECTIVE:     History of Present Illness:  Patient is a 38 y.o. female presents with pre op appt. Pt is scheduled for Hysterectomy with bilateral salpingectomy and right oophorectomy.     Chief Complaint   Patient presents with    Pre-op Exam       Review of patient's allergies indicates:  No Known Allergies    Current Outpatient Medications   Medication Sig Dispense Refill    FEROSUL 325 mg (65 mg iron) Tab tablet Take by mouth 2 (two) times a day.      hydrOXYzine pamoate (VISTARIL) 25 MG Cap Take 1 capsule (25 mg total) by mouth every 8 (eight) hours as needed. (Patient not taking: Reported on 10/17/2023) 30 capsule 1    meloxicam (MOBIC) 15 MG tablet Take 1 tablet (15 mg total) by mouth daily as needed for Pain. (Patient not taking: Reported on 10/17/2023) 30 tablet 5    mv-min/iron/folic/calcium/vitK (WOMEN'S MULTIVITAMIN ORAL) Take by mouth.      predniSONE (DELTASONE) 10 MG tablet Take 10 mg by mouth.       No current facility-administered medications for this visit.       History reviewed. No pertinent past medical history.  Past Surgical History:   Procedure Laterality Date    EXTERNAL EAR SURGERY Right     HAND SURGERY Right     TUBAL LIGATION       Family History   Problem Relation Age of Onset    Diabetes Mellitus Mother     Diabetes Mellitus Father     Hypertension Maternal Grandfather     Diabetes Paternal Grandmother     Hypertension Paternal Grandfather      Social History     Tobacco Use    Smoking status: Former     Types: Cigars     Passive exposure: Current    Smokeless tobacco: Never   Substance Use Topics    Alcohol use: Yes     Alcohol/week: 2.0 standard drinks of alcohol     Types: 2 Glasses of wine per week    Drug use: Not Currently        Review of Systems:  Review of Systems   Constitutional:  Negative for appetite change, chills, fatigue and fever.   HENT: Negative.     Eyes: Negative.    Respiratory:  Negative for cough, chest tightness and shortness of breath.     Cardiovascular:  Negative for chest pain, palpitations and leg swelling.   Gastrointestinal:  Negative for abdominal distention, abdominal pain, blood in stool, constipation, diarrhea, nausea and vomiting.   Endocrine: Negative for cold intolerance, heat intolerance, polydipsia, polyphagia and polyuria.   Genitourinary:  Positive for menstrual problem (menorrhagia) and pelvic pain. Negative for difficulty urinating, dyspareunia, dysuria, flank pain, frequency, urgency, vaginal bleeding, vaginal discharge and vaginal pain.   Musculoskeletal: Negative.    Skin: Negative.    Neurological: Negative.    Psychiatric/Behavioral: Negative.  Negative for agitation, behavioral problems, confusion and sleep disturbance. The patient is not nervous/anxious.        OBJECTIVE:     Vital Signs (Most Recent)  Pulse: 105 (12/04/23 1121)  BP: 130/85 (12/04/23 1121)     (!) 141.7 kg (312 lb 6.4 oz)     Physical Exam:  Physical Exam  Vitals reviewed. Exam conducted with a chaperone present.   Constitutional:       Appearance: Normal appearance.   HENT:      Head: Normocephalic and atraumatic.      Mouth/Throat:      Mouth: Mucous membranes are moist.   Eyes:      Extraocular Movements: Extraocular movements intact.      Pupils: Pupils are equal, round, and reactive to light.   Cardiovascular:      Rate and Rhythm: Normal rate and regular rhythm.      Pulses: Normal pulses.      Heart sounds: Normal heart sounds.   Pulmonary:      Effort: Pulmonary effort is normal.      Breath sounds: Normal breath sounds.   Abdominal:      General: Abdomen is flat. Bowel sounds are normal.      Palpations: Abdomen is soft.   Musculoskeletal:         General: Normal range of motion.      Cervical back: Normal range of motion.   Skin:     General: Skin is warm and dry.   Neurological:      General: No focal deficit present.      Mental Status: She is alert and oriented to person, place, and time.   Psychiatric:         Mood and Affect: Mood normal.          Behavior: Behavior normal.         Thought Content: Thought content normal.         Judgment: Judgment normal.         Laboratory    Office Visit on 12/04/2023   Component Date Value Ref Range Status    Sodium 12/04/2023 141  136 - 145 mmol/L Final    Potassium 12/04/2023 3.6  3.5 - 5.1 mmol/L Final    Chloride 12/04/2023 106  98 - 107 mmol/L Final    CO2 12/04/2023 28  21 - 32 mmol/L Final    Anion Gap 12/04/2023 11  7 - 16 mmol/L Final    Glucose 12/04/2023 100  74 - 106 mg/dL Final    BUN 12/04/2023 7  7 - 18 mg/dL Final    Creatinine 12/04/2023 0.86  0.55 - 1.02 mg/dL Final    BUN/Creatinine Ratio 12/04/2023 8  6 - 20 Final    Calcium 12/04/2023 8.9  8.5 - 10.1 mg/dL Final    Total Protein 12/04/2023 6.9  6.4 - 8.2 g/dL Final    Albumin 12/04/2023 3.6  3.5 - 5.0 g/dL Final    Globulin 12/04/2023 3.3  2.0 - 4.0 g/dL Final    A/G Ratio 12/04/2023 1.1   Final    Bilirubin, Total 12/04/2023 0.6  >0.0 - 1.2 mg/dL Final    Alk Phos 12/04/2023 65  37 - 98 U/L Final    ALT 12/04/2023 19  13 - 56 U/L Final    AST 12/04/2023 14 (L)  15 - 37 U/L Final    eGFR 12/04/2023 89  >=60 mL/min/1.73m2 Final    Specimen Outdate 12/04/2023 12/07/2023 23:59   Final    Group & Rh 12/04/2023 O POS   Final    Indirect Michel 12/04/2023 NEG   Final    Color, UA 12/04/2023 Dark Yellow  Colorless, Straw, Yellow, Light Yellow, Dark Yellow Final    Clarity, UA 12/04/2023 Cloudy  Clear Final    pH, UA 12/04/2023 5.5  5.0 to 8.0 pH Units Final    Leukocytes, UA 12/04/2023 Large (A)  Negative Final    Nitrites, UA 12/04/2023 Negative  Negative Final    Protein, UA 12/04/2023 10 (A)  Negative Final    Glucose, UA 12/04/2023 Normal  Normal mg/dL Final    Ketones, UA 12/04/2023 Negative  Negative mg/dL Final    Urobilinogen, UA 12/04/2023 Normal  0.2, 1.0, Normal mg/dL Final    Bilirubin, UA 12/04/2023 Negative  Negative Final    Blood, UA 12/04/2023 Trace (A)  Negative Final    Specific Gravity, UA 12/04/2023 1.024  <=1.030 Final    WBC  12/04/2023 7.75  4.50 - 11.00 K/uL Final    RBC 12/04/2023 4.96  4.20 - 5.40 M/uL Final    Hemoglobin 12/04/2023 13.1  12.0 - 16.0 g/dL Final    Hematocrit 12/04/2023 43.2  38.0 - 47.0 % Final    MCV 12/04/2023 87.1  80.0 - 96.0 fL Final    MCH 12/04/2023 26.4 (L)  27.0 - 31.0 pg Final    MCHC 12/04/2023 30.3 (L)  32.0 - 36.0 g/dL Final    RDW 12/04/2023 14.9 (H)  11.5 - 14.5 % Final    Platelet Count 12/04/2023 336  150 - 400 K/uL Final    MPV 12/04/2023 10.5  9.4 - 12.4 fL Final    Neutrophils % 12/04/2023 72.2 (H)  53.0 - 65.0 % Final    Lymphocytes % 12/04/2023 21.7 (L)  27.0 - 41.0 % Final    Monocytes % 12/04/2023 4.5  2.0 - 6.0 % Final    Eosinophils % 12/04/2023 0.9 (L)  1.0 - 4.0 % Final    Basophils % 12/04/2023 0.4  0.0 - 1.0 % Final    Immature Granulocytes % 12/04/2023 0.3  0.0 - 0.4 % Final    nRBC, Auto 12/04/2023 0.0  <=0.0 % Final    Neutrophils, Abs 12/04/2023 5.60  1.80 - 7.70 K/uL Final    Lymphocytes, Absolute 12/04/2023 1.68  1.00 - 4.80 K/uL Final    Monocytes, Absolute 12/04/2023 0.35  0.00 - 0.80 K/uL Final    Eosinophils, Absolute 12/04/2023 0.07  0.00 - 0.50 K/uL Final    Basophils, Absolute 12/04/2023 0.03  0.00 - 0.20 K/uL Final    Immature Granulocytes, Absolute 12/04/2023 0.02  0.00 - 0.04 K/uL Final    nRBC, Absolute 12/04/2023 0.00  <=0.00 x10e3/uL Final    Diff Type 12/04/2023 Auto   Final    WBC, UA 12/04/2023 98 (H)  <=5 /hpf Final    RBC, UA 12/04/2023 11 (H)  <=3 /hpf Final    Bacteria, UA 12/04/2023 Few (A)  None Seen /hpf Final    Squamous Epithelial Cells, UA 12/04/2023 Occasional (A)  None Seen /HPF Final    WBC Clumps 12/04/2023 Many (A)  None Seen /hpf Final    Yeast, UA 12/04/2023 Occasional (A)  None Seen /hpf Final    Mucous 12/04/2023 Occasional (A)  None Seen /LPF Final    Culture, Urine 12/04/2023 No Growth To Date   Preliminary         Diagnostic Results:  US: Reviewed      ASSESSMENT/PLAN:   Daja was seen today for pre-op exam.    Diagnoses and all orders for  this visit:    Menorrhagia with regular cycle    Cyst of right ovary    Pelvic pain    Enlarged uterus    Pre-op testing  -     CBC Auto Differential; Future  -     Comprehensive Metabolic Panel; Future  -     Type & Screen; Future  -     Urinalysis; Future  -     CBC Auto Differential  -     Comprehensive Metabolic Panel  -     Type & Screen  -     Urinalysis  -     Urinalysis, Microscopic  -     Urine culture        PLAN:Plan   Pt scheduled for TLH, bilateral salpingectomy , right oophorectomy on 12/6/2023  Risks, benefits and alternatives to the procedure reviewed with the pt  After discussion of the risk, alternatives, benefits, and indication of surgery, as well as the risk of surgery, questions were sought and answered and informed consent obtained to proceed with surgery. We discussed the risk of the procedures to include, but not be limited to, 1) bleeding, which could be possibly excessive, potentially requiring a blood transfusion and subsequent risk of hepatitis (1 in 300,000), transfusion reaction (<1%), and HIV (1 in 1,000,000); 2) injury to internal organs including the bowel, bladder, great vessels, nerves, and ureters, potentially requiring further surgery at that time or at a later date; 3) infection requiring a prolonged hospital stay and antibiotics with possible drainage of an abscess or wound breakdown; 4) anesthetic complications; 5) deep venous thrombosis and the risk of pulmonary emboli; 6) pneumonia; and 7) infertility and/or menopause, 8) possible death. All questions were answered and a consent form was signed. She stated she understood the above risks and desired to proceed.   All questions answered to the level of the patient's satisfaction.   Case request and orders done.   Written materials provided  Consent obtained.

## 2023-12-05 LAB
INDIRECT COOMBS: NORMAL
RH BLD: NORMAL
SPECIMEN OUTDATE: NORMAL

## 2023-12-06 ENCOUNTER — ANESTHESIA (OUTPATIENT)
Dept: SURGERY | Facility: HOSPITAL | Age: 38
End: 2023-12-06
Payer: COMMERCIAL

## 2023-12-06 ENCOUNTER — HOSPITAL ENCOUNTER (OUTPATIENT)
Facility: HOSPITAL | Age: 38
Discharge: HOME | End: 2023-12-06
Attending: OBSTETRICS & GYNECOLOGY | Admitting: OBSTETRICS & GYNECOLOGY
Payer: COMMERCIAL

## 2023-12-06 ENCOUNTER — ANESTHESIA EVENT (OUTPATIENT)
Dept: SURGERY | Facility: HOSPITAL | Age: 38
End: 2023-12-06
Payer: COMMERCIAL

## 2023-12-06 VITALS
TEMPERATURE: 98 F | HEART RATE: 82 BPM | WEIGHT: 293 LBS | BODY MASS INDEX: 44.41 KG/M2 | SYSTOLIC BLOOD PRESSURE: 110 MMHG | OXYGEN SATURATION: 92 % | RESPIRATION RATE: 16 BRPM | HEIGHT: 68 IN | DIASTOLIC BLOOD PRESSURE: 64 MMHG

## 2023-12-06 DIAGNOSIS — N85.2 ENLARGED UTERUS: ICD-10-CM

## 2023-12-06 DIAGNOSIS — N92.0 MENORRHAGIA WITH REGULAR CYCLE: ICD-10-CM

## 2023-12-06 DIAGNOSIS — N83.201 CYST OF RIGHT OVARY: ICD-10-CM

## 2023-12-06 DIAGNOSIS — R10.2 PELVIC PAIN: ICD-10-CM

## 2023-12-06 DIAGNOSIS — Z90.710 STATUS POST LAPAROSCOPIC HYSTERECTOMY: Primary | ICD-10-CM

## 2023-12-06 LAB
B-HCG UR QL: NEGATIVE
BASOPHILS # BLD AUTO: 0.02 K/UL (ref 0–0.2)
BASOPHILS NFR BLD AUTO: 0.1 % (ref 0–1)
CTP QC/QA: YES
DIFFERENTIAL METHOD BLD: ABNORMAL
EOSINOPHIL # BLD AUTO: 0 K/UL (ref 0–0.5)
EOSINOPHIL NFR BLD AUTO: 0 % (ref 1–4)
ERYTHROCYTE [DISTWIDTH] IN BLOOD BY AUTOMATED COUNT: 14.3 % (ref 11.5–14.5)
HCT VFR BLD AUTO: 39.1 % (ref 38–47)
HGB BLD-MCNC: 12.4 G/DL (ref 12–16)
IMM GRANULOCYTES # BLD AUTO: 0.04 K/UL (ref 0–0.04)
IMM GRANULOCYTES NFR BLD: 0.3 % (ref 0–0.4)
LYMPHOCYTES # BLD AUTO: 0.58 K/UL (ref 1–4.8)
LYMPHOCYTES NFR BLD AUTO: 4.1 % (ref 27–41)
MCH RBC QN AUTO: 26.4 PG (ref 27–31)
MCHC RBC AUTO-ENTMCNC: 31.7 G/DL (ref 32–36)
MCV RBC AUTO: 83.2 FL (ref 80–96)
MONOCYTES # BLD AUTO: 0.18 K/UL (ref 0–0.8)
MONOCYTES NFR BLD AUTO: 1.3 % (ref 2–6)
MPC BLD CALC-MCNC: 9.7 FL (ref 9.4–12.4)
NEUTROPHILS # BLD AUTO: 13.22 K/UL (ref 1.8–7.7)
NEUTROPHILS NFR BLD AUTO: 94.2 % (ref 53–65)
NRBC # BLD AUTO: 0 X10E3/UL
NRBC, AUTO (.00): 0 %
PLATELET # BLD AUTO: 338 K/UL (ref 150–400)
RBC # BLD AUTO: 4.7 M/UL (ref 4.2–5.4)
UA COMPLETE W REFLEX CULTURE PNL UR: NORMAL
WBC # BLD AUTO: 14.04 K/UL (ref 4.5–11)

## 2023-12-06 PROCEDURE — 85025 COMPLETE CBC W/AUTO DIFF WBC: CPT | Performed by: OBSTETRICS & GYNECOLOGY

## 2023-12-06 PROCEDURE — 88307 TISSUE EXAM BY PATHOLOGIST: CPT | Mod: 26,,, | Performed by: PATHOLOGY

## 2023-12-06 PROCEDURE — 58573 TLH W/T/O UTERUS OVER 250 G: CPT | Mod: ,,, | Performed by: OBSTETRICS & GYNECOLOGY

## 2023-12-06 PROCEDURE — C2628 CATHETER, OCCLUSION: HCPCS | Performed by: OBSTETRICS & GYNECOLOGY

## 2023-12-06 PROCEDURE — 81025 URINE PREGNANCY TEST: CPT | Performed by: OBSTETRICS & GYNECOLOGY

## 2023-12-06 PROCEDURE — 88307 SURGICAL PATHOLOGY: ICD-10-PCS | Mod: 26,,, | Performed by: PATHOLOGY

## 2023-12-06 PROCEDURE — 25000003 PHARM REV CODE 250: Performed by: OBSTETRICS & GYNECOLOGY

## 2023-12-06 PROCEDURE — 27000165 HC TUBE, ETT CUFFED: Performed by: ANESTHESIOLOGY

## 2023-12-06 PROCEDURE — 71000016 HC POSTOP RECOV ADDL HR: Performed by: OBSTETRICS & GYNECOLOGY

## 2023-12-06 PROCEDURE — 36000710: Performed by: OBSTETRICS & GYNECOLOGY

## 2023-12-06 PROCEDURE — D9220A PRA ANESTHESIA: ICD-10-PCS | Mod: ANES,,, | Performed by: ANESTHESIOLOGY

## 2023-12-06 PROCEDURE — 58573 PR LAPAROSCOPY TOT HYSTERECTOMY UTERUS >250 GRAM W TUBE/OVARY: ICD-10-PCS | Mod: ,,, | Performed by: OBSTETRICS & GYNECOLOGY

## 2023-12-06 PROCEDURE — 71000033 HC RECOVERY, INTIAL HOUR: Performed by: OBSTETRICS & GYNECOLOGY

## 2023-12-06 PROCEDURE — 71000015 HC POSTOP RECOV 1ST HR: Performed by: OBSTETRICS & GYNECOLOGY

## 2023-12-06 PROCEDURE — 36000711: Performed by: OBSTETRICS & GYNECOLOGY

## 2023-12-06 PROCEDURE — 27201423 OPTIME MED/SURG SUP & DEVICES STERILE SUPPLY: Performed by: OBSTETRICS & GYNECOLOGY

## 2023-12-06 PROCEDURE — 27000689 HC BLADE LARYNGOSCOPE ANY SIZE: Performed by: ANESTHESIOLOGY

## 2023-12-06 PROCEDURE — 37000009 HC ANESTHESIA EA ADD 15 MINS: Performed by: OBSTETRICS & GYNECOLOGY

## 2023-12-06 PROCEDURE — 27000510 HC BLANKET BAIR HUGGER ANY SIZE: Performed by: ANESTHESIOLOGY

## 2023-12-06 PROCEDURE — D9220A PRA ANESTHESIA: ICD-10-PCS | Mod: CRNA,,, | Performed by: NURSE ANESTHETIST, CERTIFIED REGISTERED

## 2023-12-06 PROCEDURE — 25000003 PHARM REV CODE 250: Performed by: NURSE ANESTHETIST, CERTIFIED REGISTERED

## 2023-12-06 PROCEDURE — 37000008 HC ANESTHESIA 1ST 15 MINUTES: Performed by: OBSTETRICS & GYNECOLOGY

## 2023-12-06 PROCEDURE — D9220A PRA ANESTHESIA: Mod: ANES,,, | Performed by: ANESTHESIOLOGY

## 2023-12-06 PROCEDURE — D9220A PRA ANESTHESIA: Mod: CRNA,,, | Performed by: NURSE ANESTHETIST, CERTIFIED REGISTERED

## 2023-12-06 PROCEDURE — 71000039 HC RECOVERY, EACH ADD'L HOUR: Performed by: OBSTETRICS & GYNECOLOGY

## 2023-12-06 PROCEDURE — 63600175 PHARM REV CODE 636 W HCPCS: Performed by: ANESTHESIOLOGY

## 2023-12-06 PROCEDURE — 88307 TISSUE EXAM BY PATHOLOGIST: CPT | Mod: TC,SUR | Performed by: OBSTETRICS & GYNECOLOGY

## 2023-12-06 PROCEDURE — 25000003 PHARM REV CODE 250: Performed by: ANESTHESIOLOGY

## 2023-12-06 PROCEDURE — 27000655: Performed by: ANESTHESIOLOGY

## 2023-12-06 PROCEDURE — 63600175 PHARM REV CODE 636 W HCPCS: Performed by: NURSE ANESTHETIST, CERTIFIED REGISTERED

## 2023-12-06 PROCEDURE — 27000509 HC TUBE SALEM SUMP ANY SIZE: Performed by: ANESTHESIOLOGY

## 2023-12-06 RX ORDER — MIDAZOLAM HYDROCHLORIDE 1 MG/ML
INJECTION INTRAMUSCULAR; INTRAVENOUS
Status: DISCONTINUED | OUTPATIENT
Start: 2023-12-06 | End: 2023-12-06

## 2023-12-06 RX ORDER — MUPIROCIN 20 MG/G
1 OINTMENT TOPICAL 2 TIMES DAILY
Status: DISCONTINUED | OUTPATIENT
Start: 2023-12-06 | End: 2023-12-06 | Stop reason: HOSPADM

## 2023-12-06 RX ORDER — ONDANSETRON 2 MG/ML
INJECTION INTRAMUSCULAR; INTRAVENOUS
Status: DISCONTINUED | OUTPATIENT
Start: 2023-12-06 | End: 2023-12-06

## 2023-12-06 RX ORDER — DIPHENHYDRAMINE HCL 25 MG
25 CAPSULE ORAL EVERY 4 HOURS PRN
Status: DISCONTINUED | OUTPATIENT
Start: 2023-12-06 | End: 2023-12-06 | Stop reason: HOSPADM

## 2023-12-06 RX ORDER — ONDANSETRON 4 MG/1
8 TABLET, ORALLY DISINTEGRATING ORAL EVERY 8 HOURS PRN
Status: DISCONTINUED | OUTPATIENT
Start: 2023-12-06 | End: 2023-12-06 | Stop reason: HOSPADM

## 2023-12-06 RX ORDER — SIMETHICONE 80 MG
80 TABLET,CHEWABLE ORAL EVERY 4 HOURS PRN
Status: DISCONTINUED | OUTPATIENT
Start: 2023-12-06 | End: 2023-12-06 | Stop reason: HOSPADM

## 2023-12-06 RX ORDER — ACETAMINOPHEN 10 MG/ML
INJECTION, SOLUTION INTRAVENOUS
Status: DISCONTINUED | OUTPATIENT
Start: 2023-12-06 | End: 2023-12-06

## 2023-12-06 RX ORDER — SODIUM CHLORIDE, SODIUM LACTATE, POTASSIUM CHLORIDE, CALCIUM CHLORIDE 600; 310; 30; 20 MG/100ML; MG/100ML; MG/100ML; MG/100ML
INJECTION, SOLUTION INTRAVENOUS CONTINUOUS
Status: DISCONTINUED | OUTPATIENT
Start: 2023-12-06 | End: 2023-12-06 | Stop reason: HOSPADM

## 2023-12-06 RX ORDER — MEPERIDINE HYDROCHLORIDE 25 MG/ML
25 INJECTION INTRAMUSCULAR; INTRAVENOUS; SUBCUTANEOUS EVERY 10 MIN PRN
Status: DISCONTINUED | OUTPATIENT
Start: 2023-12-06 | End: 2023-12-06 | Stop reason: HOSPADM

## 2023-12-06 RX ORDER — ROCURONIUM BROMIDE 10 MG/ML
INJECTION, SOLUTION INTRAVENOUS
Status: DISCONTINUED | OUTPATIENT
Start: 2023-12-06 | End: 2023-12-06

## 2023-12-06 RX ORDER — HYDROCODONE BITARTRATE AND ACETAMINOPHEN 5; 325 MG/1; MG/1
1 TABLET ORAL EVERY 4 HOURS PRN
Status: DISCONTINUED | OUTPATIENT
Start: 2023-12-06 | End: 2023-12-06 | Stop reason: HOSPADM

## 2023-12-06 RX ORDER — ONDANSETRON 2 MG/ML
4 INJECTION INTRAMUSCULAR; INTRAVENOUS DAILY PRN
Status: DISCONTINUED | OUTPATIENT
Start: 2023-12-06 | End: 2023-12-06 | Stop reason: HOSPADM

## 2023-12-06 RX ORDER — DIAZEPAM 5 MG/1
10 TABLET ORAL ONCE
Status: COMPLETED | OUTPATIENT
Start: 2023-12-06 | End: 2023-12-06

## 2023-12-06 RX ORDER — IPRATROPIUM BROMIDE AND ALBUTEROL SULFATE 2.5; .5 MG/3ML; MG/3ML
3 SOLUTION RESPIRATORY (INHALATION) ONCE
Status: DISCONTINUED | OUTPATIENT
Start: 2023-12-06 | End: 2023-12-06 | Stop reason: HOSPADM

## 2023-12-06 RX ORDER — PROPOFOL 10 MG/ML
VIAL (ML) INTRAVENOUS
Status: DISCONTINUED | OUTPATIENT
Start: 2023-12-06 | End: 2023-12-06

## 2023-12-06 RX ORDER — HYDROCODONE BITARTRATE AND ACETAMINOPHEN 7.5; 325 MG/1; MG/1
1 TABLET ORAL EVERY 6 HOURS PRN
Qty: 28 TABLET | Refills: 0 | Status: SHIPPED | OUTPATIENT
Start: 2023-12-06 | End: 2024-01-04 | Stop reason: ALTCHOICE

## 2023-12-06 RX ORDER — LIDOCAINE HYDROCHLORIDE 20 MG/ML
INJECTION, SOLUTION EPIDURAL; INFILTRATION; INTRACAUDAL; PERINEURAL
Status: DISCONTINUED | OUTPATIENT
Start: 2023-12-06 | End: 2023-12-06

## 2023-12-06 RX ORDER — FENTANYL CITRATE 50 UG/ML
INJECTION, SOLUTION INTRAMUSCULAR; INTRAVENOUS
Status: DISCONTINUED | OUTPATIENT
Start: 2023-12-06 | End: 2023-12-06

## 2023-12-06 RX ORDER — DIMENHYDRINATE 50 MG
50 TABLET ORAL ONCE
Status: COMPLETED | OUTPATIENT
Start: 2023-12-06 | End: 2023-12-06

## 2023-12-06 RX ORDER — KETOROLAC TROMETHAMINE 30 MG/ML
INJECTION, SOLUTION INTRAMUSCULAR; INTRAVENOUS
Status: DISCONTINUED | OUTPATIENT
Start: 2023-12-06 | End: 2023-12-06

## 2023-12-06 RX ORDER — HYDROMORPHONE HYDROCHLORIDE 2 MG/ML
0.5 INJECTION, SOLUTION INTRAMUSCULAR; INTRAVENOUS; SUBCUTANEOUS EVERY 5 MIN PRN
Status: DISCONTINUED | OUTPATIENT
Start: 2023-12-06 | End: 2023-12-06 | Stop reason: HOSPADM

## 2023-12-06 RX ORDER — MORPHINE SULFATE 10 MG/ML
3 INJECTION INTRAMUSCULAR; INTRAVENOUS; SUBCUTANEOUS
Status: DISCONTINUED | OUTPATIENT
Start: 2023-12-06 | End: 2023-12-06 | Stop reason: HOSPADM

## 2023-12-06 RX ORDER — CEFAZOLIN SODIUM 1 G/3ML
INJECTION, POWDER, FOR SOLUTION INTRAMUSCULAR; INTRAVENOUS
Status: DISCONTINUED | OUTPATIENT
Start: 2023-12-06 | End: 2023-12-06

## 2023-12-06 RX ORDER — KETAMINE HYDROCHLORIDE 50 MG/ML
INJECTION, SOLUTION INTRAMUSCULAR; INTRAVENOUS
Status: DISCONTINUED | OUTPATIENT
Start: 2023-12-06 | End: 2023-12-06

## 2023-12-06 RX ORDER — DIPHENHYDRAMINE HYDROCHLORIDE 50 MG/ML
25 INJECTION INTRAMUSCULAR; INTRAVENOUS EVERY 6 HOURS PRN
Status: DISCONTINUED | OUTPATIENT
Start: 2023-12-06 | End: 2023-12-06 | Stop reason: HOSPADM

## 2023-12-06 RX ORDER — MORPHINE SULFATE 10 MG/ML
4 INJECTION INTRAMUSCULAR; INTRAVENOUS; SUBCUTANEOUS EVERY 5 MIN PRN
Status: DISCONTINUED | OUTPATIENT
Start: 2023-12-06 | End: 2023-12-06 | Stop reason: HOSPADM

## 2023-12-06 RX ORDER — PROCHLORPERAZINE EDISYLATE 5 MG/ML
5 INJECTION INTRAMUSCULAR; INTRAVENOUS EVERY 6 HOURS PRN
Status: DISCONTINUED | OUTPATIENT
Start: 2023-12-06 | End: 2023-12-06 | Stop reason: HOSPADM

## 2023-12-06 RX ORDER — SCOLOPAMINE TRANSDERMAL SYSTEM 1 MG/1
1 PATCH, EXTENDED RELEASE TRANSDERMAL
Status: DISCONTINUED | OUTPATIENT
Start: 2023-12-06 | End: 2023-12-06 | Stop reason: HOSPADM

## 2023-12-06 RX ORDER — DEXAMETHASONE SODIUM PHOSPHATE 4 MG/ML
INJECTION, SOLUTION INTRA-ARTICULAR; INTRALESIONAL; INTRAMUSCULAR; INTRAVENOUS; SOFT TISSUE
Status: DISCONTINUED | OUTPATIENT
Start: 2023-12-06 | End: 2023-12-06

## 2023-12-06 RX ADMIN — ROCURONIUM BROMIDE 70 MG: 10 INJECTION, SOLUTION INTRAVENOUS at 07:12

## 2023-12-06 RX ADMIN — ROCURONIUM BROMIDE 20 MG: 10 INJECTION, SOLUTION INTRAVENOUS at 08:12

## 2023-12-06 RX ADMIN — ACETAMINOPHEN 1000 MG: 10 INJECTION, SOLUTION INTRAVENOUS at 09:12

## 2023-12-06 RX ADMIN — SODIUM CHLORIDE, POTASSIUM CHLORIDE, SODIUM LACTATE AND CALCIUM CHLORIDE: 600; 310; 30; 20 INJECTION, SOLUTION INTRAVENOUS at 09:12

## 2023-12-06 RX ADMIN — FENTANYL CITRATE 100 MCG: 50 INJECTION INTRAMUSCULAR; INTRAVENOUS at 07:12

## 2023-12-06 RX ADMIN — PROPOFOL 300 MG: 10 INJECTION, EMULSION INTRAVENOUS at 07:12

## 2023-12-06 RX ADMIN — DIAZEPAM 10 MG: 5 TABLET ORAL at 07:12

## 2023-12-06 RX ADMIN — FENTANYL CITRATE 100 MCG: 50 INJECTION INTRAMUSCULAR; INTRAVENOUS at 08:12

## 2023-12-06 RX ADMIN — SCOPOLAMINE 1 PATCH: 1.5 PATCH, EXTENDED RELEASE TRANSDERMAL at 07:12

## 2023-12-06 RX ADMIN — SODIUM CHLORIDE, POTASSIUM CHLORIDE, SODIUM LACTATE AND CALCIUM CHLORIDE: 600; 310; 30; 20 INJECTION, SOLUTION INTRAVENOUS at 07:12

## 2023-12-06 RX ADMIN — LIDOCAINE HYDROCHLORIDE 60 MG: 20 INJECTION, SOLUTION INTRAVENOUS at 07:12

## 2023-12-06 RX ADMIN — KETOROLAC TROMETHAMINE 30 MG: 30 INJECTION, SOLUTION INTRAMUSCULAR; INTRAVENOUS at 09:12

## 2023-12-06 RX ADMIN — CEFAZOLIN 3 G: 1 INJECTION, POWDER, FOR SOLUTION INTRAMUSCULAR; INTRAVENOUS; PARENTERAL at 07:12

## 2023-12-06 RX ADMIN — HYDROMORPHONE HYDROCHLORIDE 0.5 MG: 2 INJECTION INTRAMUSCULAR; INTRAVENOUS; SUBCUTANEOUS at 10:12

## 2023-12-06 RX ADMIN — MIDAZOLAM 2 MG: 1 INJECTION INTRAMUSCULAR; INTRAVENOUS at 07:12

## 2023-12-06 RX ADMIN — KETAMINE HYDROCHLORIDE 10 MG: 50 INJECTION INTRAMUSCULAR; INTRAVENOUS at 09:12

## 2023-12-06 RX ADMIN — DEXAMETHASONE SODIUM PHOSPHATE 8 MG: 4 INJECTION, SOLUTION INTRA-ARTICULAR; INTRALESIONAL; INTRAMUSCULAR; INTRAVENOUS; SOFT TISSUE at 08:12

## 2023-12-06 RX ADMIN — HYDROCODONE BITARTRATE AND ACETAMINOPHEN 1 TABLET: 5; 325 TABLET ORAL at 12:12

## 2023-12-06 RX ADMIN — DIMENHYDRINATE 50 MG: 50 TABLET ORAL at 07:12

## 2023-12-06 RX ADMIN — KETAMINE HYDROCHLORIDE 25 MG: 50 INJECTION INTRAMUSCULAR; INTRAVENOUS at 08:12

## 2023-12-06 RX ADMIN — KETOROLAC TROMETHAMINE 30 MG: 30 INJECTION, SOLUTION INTRAMUSCULAR at 09:12

## 2023-12-06 RX ADMIN — ONDANSETRON 4 MG: 2 INJECTION INTRAMUSCULAR; INTRAVENOUS at 08:12

## 2023-12-06 RX ADMIN — SUGAMMADEX 200 MG: 100 INJECTION, SOLUTION INTRAVENOUS at 09:12

## 2023-12-06 NOTE — PROGRESS NOTES
1120 RELEASED TO [facility] RN AWAKE, ALERT. V/S [id number]%. FAMILY AT BEDSIDE.

## 2023-12-06 NOTE — PROGRESS NOTES
956 RECEIVED TO RR EASILY AROUSED. O2 VIA FM. ORIENTATION GIVEN. NO RESP. DISTRESS NOTED. ABDOMEN SOFT WITH SINGLE OP-SITE APPROXIMATED WITH DERMABOND ADHESIVE DRESSING. WALTER PAD WITHOUT VAGINAL BLEEDING NOTED. WEIR CATH P/D YELLOW URINE. IV INFUSING WELL RIGHT HAND 22G. CATH. SCD HOSE IN PROGRESS. NO C/O PAIN. SEE FLOW SHEET.    1015 C/O BEING COLD WARMING BLANKET IN PROGRESS. NO C/O PAIN.    1045 C/O PAIN AT OP-SITE. DILAUDID TITRATED FOR RELIEF. WAITING ON ORDERS FROM PHYSICIAN.    1105 WEIR CATH DISCONTINUED AS ORDERED.    1111 AWAKE,ALERT. NO C/O PAIN. NO VAGINAL BLEEDING, OPSITE INTACT. TRANSFERRED TO ROOM.

## 2023-12-06 NOTE — TRANSFER OF CARE
"Anesthesia Transfer of Care Note    Patient: Daja Alfaro    Procedure(s) Performed: Procedure(s) (LRB):  HYSTERECTOMY,TOTAL,LAPAROSCOPIC,WITH SALPINGO-OOPHORECTOMY (Right)  CYSTOSCOPY (N/A)    Patient location: PACU    Anesthesia Type: general    Transport from OR: Transported from OR on 6-10 L/min O2 by face mask with adequate spontaneous ventilation    Post pain: adequate analgesia    Post assessment: no apparent anesthetic complications    Post vital signs: stable    Level of consciousness: responds to stimulation and awake    Nausea/Vomiting: no nausea/vomiting    Complications: none    Transfer of care protocol was followedComments: Good SV continue, NAD noted, VSS, RTRN      Last vitals: Visit Vitals  BP (!) 138/90   Pulse 95   Temp 36.5 °C (97.7 °F)   Resp 20   Ht 5' 8" (1.727 m)   Wt (!) 145.2 kg (320 lb)   SpO2 98%   Breastfeeding No   BMI 48.66 kg/m²     "

## 2023-12-06 NOTE — ANESTHESIA PROCEDURE NOTES
Intubation    Date/Time: 12/6/2023 7:49 AM    Performed by: Trinity Saravia CRNA  Authorized by: Hernan Angulo MD    Intubation:     Induction:  Intravenous    Intubated:  Postinduction    Mask Ventilation:  Easy mask    Attempts:  1    Attempted By:  CRNA    Method of Intubation:  Direct    Blade:  Yolie 3    Laryngeal View Grade: Grade I - full view of cords      Difficult Airway Encountered?: No      Complications:  None    Airway Device:  Oral endotracheal tube    Airway Device Size:  7.5    Style/Cuff Inflation:  Cuffed (inflated to minimal occlusive pressure)    Tube secured:  22    Secured at:  The lips    Placement Verified By:  Capnometry    Complicating Factors:  None    Findings Post-Intubation:  BS equal bilateral and atraumatic/condition of teeth unchanged

## 2023-12-06 NOTE — ANESTHESIA PREPROCEDURE EVALUATION
12/06/2023  Daja Alfaro is a 38 y.o., female.      Pre-op Assessment    I have reviewed the Patient Summary Reports.     I have reviewed the Nursing Notes. I have reviewed the NPO Status.   I have reviewed the Medications.     Review of Systems  Anesthesia Hx:             Denies Family Hx of Anesthesia complications.    Denies Personal Hx of Anesthesia complications.                    Social:  Non-Smoker, No Alcohol Use       Hematology/Oncology:    Oncology Normal    -- Anemia:                                  EENT/Dental:  EENT/Dental Normal           Cardiovascular:  Cardiovascular Normal                                            Pulmonary:  Pulmonary Normal                       Renal/:  Renal/ Normal                 Hepatic/GI:  Hepatic/GI Normal                 Musculoskeletal:  Musculoskeletal Normal                Neurological:  Neurology Normal                                      Endocrine:  Endocrine Normal          Morbid Obesity / BMI > 40  Dermatological:  Skin Normal    Psych:  Psychiatric Normal                    Physical Exam  General: Well nourished, Cooperative, Alert and Oriented    Airway:  Mallampati: II / II  Mouth Opening: Normal  TM Distance: Normal  Neck ROM: Normal ROM    Dental:  Intact    Chest/Lungs:  Clear to auscultation    Heart:  Rate: Normal  Rhythm: Regular Rhythm  Sounds: Normal        Chemistry        Component Value Date/Time     12/04/2023 1638    K 3.6 12/04/2023 1638     12/04/2023 1638    CO2 28 12/04/2023 1638    BUN 7 12/04/2023 1638    CREATININE 0.86 12/04/2023 1638     12/04/2023 1638        Component Value Date/Time    CALCIUM 8.9 12/04/2023 1638    ALKPHOS 65 12/04/2023 1638    AST 14 (L) 12/04/2023 1638    ALT 19 12/04/2023 1638    BILITOT 0.6 12/04/2023 1638        Lab Results   Component Value Date    WBC 7.75 12/04/2023     HGB 13.1 12/04/2023    HCT 43.2 12/04/2023     12/04/2023     No results found for this or any previous visit.      Anesthesia Plan  Type of Anesthesia, risks & benefits discussed:    Anesthesia Type: Gen ETT, Regional  Intra-op Monitoring Plan: Standard ASA Monitors  Post Op Pain Control Plan: multimodal analgesia and peripheral nerve block  Induction:  IV  Airway Plan: Direct  Informed Consent: Informed consent signed with the Patient and all parties understand the risks and agree with anesthesia plan.  All questions answered.   ASA Score: 2  Day of Surgery Review of History & Physical: H&P Update referred to the surgeon/provider.I have interviewed and examined the patient. I have reviewed the patient's H&P dated: There are no significant changes.     Ready For Surgery From Anesthesia Perspective.     .

## 2023-12-08 LAB
ESTROGEN SERPL-MCNC: NORMAL PG/ML
INSULIN SERPL-ACNC: NORMAL U[IU]/ML
LAB AP GROSS DESCRIPTION: NORMAL
LAB AP LABORATORY NOTES: NORMAL
T3RU NFR SERPL: NORMAL %

## 2023-12-08 NOTE — ANESTHESIA POSTPROCEDURE EVALUATION
Anesthesia Post Evaluation    Patient: Daja Alfaro    Procedure(s) Performed: Procedure(s) (LRB):  HYSTERECTOMY,TOTAL,LAPAROSCOPIC,WITH SALPINGO-OOPHORECTOMY (Right)  CYSTOSCOPY (N/A)    Final Anesthesia Type: general      Patient location during evaluation: PACU  Patient participation: Yes- Able to Participate  Level of consciousness: awake and alert  Post-procedure vital signs: reviewed and stable  Pain management: adequate  Airway patency: patent  CHARO mitigation strategies: Multimodal analgesia  PONV status at discharge: No PONV  Anesthetic complications: no      Cardiovascular status: blood pressure returned to baseline  Respiratory status: unassisted  Hydration status: euvolemic  Follow-up not needed.              Vitals Value Taken Time   /64 12/06/23 1301   Temp 36.5 °C (97.7 °F) 12/06/23 1003   Pulse 77 12/06/23 1309   Resp 16 12/06/23 1220   SpO2 94 % 12/06/23 1309   Vitals shown include unvalidated device data.      Event Time   Out of Recovery 11:11:00         Pain/Jean Marie Score: No data recorded

## 2023-12-11 ENCOUNTER — OFFICE VISIT (OUTPATIENT)
Dept: OBSTETRICS AND GYNECOLOGY | Facility: CLINIC | Age: 38
End: 2023-12-11
Payer: COMMERCIAL

## 2023-12-11 ENCOUNTER — PROCEDURE VISIT (OUTPATIENT)
Dept: OBSTETRICS AND GYNECOLOGY | Facility: CLINIC | Age: 38
End: 2023-12-11
Payer: COMMERCIAL

## 2023-12-11 ENCOUNTER — TELEPHONE (OUTPATIENT)
Dept: OBSTETRICS AND GYNECOLOGY | Facility: CLINIC | Age: 38
End: 2023-12-11
Payer: COMMERCIAL

## 2023-12-11 VITALS
DIASTOLIC BLOOD PRESSURE: 90 MMHG | BODY MASS INDEX: 48.66 KG/M2 | SYSTOLIC BLOOD PRESSURE: 154 MMHG | WEIGHT: 293 LBS | HEART RATE: 111 BPM

## 2023-12-11 DIAGNOSIS — R10.2 PELVIC PAIN: Primary | ICD-10-CM

## 2023-12-11 DIAGNOSIS — K59.00 CONSTIPATION, UNSPECIFIED CONSTIPATION TYPE: ICD-10-CM

## 2023-12-11 PROCEDURE — 99499 NO LOS: ICD-10-PCS | Mod: ,,, | Performed by: OBSTETRICS & GYNECOLOGY

## 2023-12-11 PROCEDURE — 99214 OFFICE O/P EST MOD 30 MIN: CPT | Mod: 24,,, | Performed by: OBSTETRICS & GYNECOLOGY

## 2023-12-11 PROCEDURE — 3008F BODY MASS INDEX DOCD: CPT | Mod: ,,, | Performed by: OBSTETRICS & GYNECOLOGY

## 2023-12-11 PROCEDURE — 3080F DIAST BP >= 90 MM HG: CPT | Mod: ,,, | Performed by: OBSTETRICS & GYNECOLOGY

## 2023-12-11 PROCEDURE — 99499 UNLISTED E&M SERVICE: CPT | Mod: ,,, | Performed by: OBSTETRICS & GYNECOLOGY

## 2023-12-11 PROCEDURE — 3080F PR MOST RECENT DIASTOLIC BLOOD PRESSURE >= 90 MM HG: ICD-10-PCS | Mod: ,,, | Performed by: OBSTETRICS & GYNECOLOGY

## 2023-12-11 PROCEDURE — 76856 PR  ECHO,PELVIC (NONOBSTETRIC): ICD-10-PCS | Mod: ,,, | Performed by: OBSTETRICS & GYNECOLOGY

## 2023-12-11 PROCEDURE — 1159F MED LIST DOCD IN RCRD: CPT | Mod: ,,, | Performed by: OBSTETRICS & GYNECOLOGY

## 2023-12-11 PROCEDURE — 3008F PR BODY MASS INDEX (BMI) DOCUMENTED: ICD-10-PCS | Mod: ,,, | Performed by: OBSTETRICS & GYNECOLOGY

## 2023-12-11 PROCEDURE — 3077F SYST BP >= 140 MM HG: CPT | Mod: ,,, | Performed by: OBSTETRICS & GYNECOLOGY

## 2023-12-11 PROCEDURE — 1159F PR MEDICATION LIST DOCUMENTED IN MEDICAL RECORD: ICD-10-PCS | Mod: ,,, | Performed by: OBSTETRICS & GYNECOLOGY

## 2023-12-11 PROCEDURE — 3077F PR MOST RECENT SYSTOLIC BLOOD PRESSURE >= 140 MM HG: ICD-10-PCS | Mod: ,,, | Performed by: OBSTETRICS & GYNECOLOGY

## 2023-12-11 PROCEDURE — 76856 US EXAM PELVIC COMPLETE: CPT | Mod: ,,, | Performed by: OBSTETRICS & GYNECOLOGY

## 2023-12-11 PROCEDURE — 99214 PR OFFICE/OUTPT VISIT, EST, LEVL IV, 30-39 MIN: ICD-10-PCS | Mod: 24,,, | Performed by: OBSTETRICS & GYNECOLOGY

## 2023-12-11 RX ORDER — DOCUSATE SODIUM 100 MG/1
100 CAPSULE, LIQUID FILLED ORAL DAILY PRN
Qty: 30 CAPSULE | Refills: 1 | Status: SHIPPED | OUTPATIENT
Start: 2023-12-11 | End: 2024-12-10

## 2023-12-11 RX ORDER — ERGOCALCIFEROL 1.25 MG/1
50000 CAPSULE ORAL
Qty: 4 CAPSULE | Refills: 11 | Status: SHIPPED | OUTPATIENT
Start: 2023-12-11 | End: 2024-01-04

## 2023-12-11 RX ORDER — DOCUSATE SODIUM 100 MG/1
100 CAPSULE, LIQUID FILLED ORAL DAILY PRN
Qty: 30 CAPSULE | Refills: 1 | Status: SHIPPED | OUTPATIENT
Start: 2023-12-11 | End: 2023-12-11 | Stop reason: SDUPTHER

## 2023-12-11 NOTE — PROGRESS NOTES
"Subjective:      Patient ID: Daja Alfaro is a 38 y.o. female.    Chief Complaint:  Follow-up (TLH w/salpingo oophorectomy;cystoscopy of bladder), Back Pain (Pt c/o lower back pain.), and Abdominal Pain (Pt c/o lower abdominal pain.)      History of Present Illness  HPI  Postoperative Follow-up  Patient presents to the clinic 1 weeks status post total laparoscopic hysterectomy, bilateral oophorectomy, and bilateral salpingectomy for abnormal uterine bleeding and pelvic pain. Eating a regular diet without difficulty. Bowel movements are abnormal with constipation. Pain is not well controlled.  Medications being used: narcotic analgesics including hydrocodone/acetaminophen (Lorcet, Lortab, Norco, Vicodin).  {T states that her pain is primarily in her "back side" . She denies pelvic pain. She says that she feels constipated and acknowledges that she has been consuming a large quantity of food since her surgery.  Pt denies vaginal bleeding, fever or chills.  GYN & OB History  Patient's last menstrual period was 2023 (approximate).   Date of Last Pap: No result found    OB History    Para Term  AB Living   4         4   SAB IAB Ectopic Multiple Live Births                  # Outcome Date GA Lbr Mendoza/2nd Weight Sex Delivery Anes PTL Lv   4             3             2             1                 Review of Systems  Review of Systems   Constitutional:  Negative for activity change, appetite change, fatigue and unexpected weight change.   HENT: Negative.     Respiratory:  Negative for cough, shortness of breath and wheezing.    Cardiovascular:  Negative for chest pain, palpitations and leg swelling.   Gastrointestinal:  Negative for abdominal pain, bloating, blood in stool, constipation, diarrhea, nausea, vomiting and reflux.   Genitourinary:  Negative for bladder incontinence, decreased libido, dysmenorrhea, dyspareunia, dysuria, flank pain, frequency, menorrhagia, " menstrual problem, pelvic pain, urgency, vaginal bleeding, vaginal discharge, postcoital bleeding and vaginal odor.   Musculoskeletal:  Negative for back pain.   Integumentary:  Negative for rash, acne, hair changes, mole/lesion, breast mass, nipple discharge, breast skin changes and breast tenderness.   Neurological:  Negative for headaches.   Psychiatric/Behavioral:  Negative for depression and sleep disturbance. The patient is not nervous/anxious.    Breast: Negative for asymmetry, breast self exam, lump, mass, nipple discharge, skin changes and tenderness         Objective:     Physical Exam:   Constitutional: She is oriented to person, place, and time. She appears well-developed and well-nourished.    HENT:   Head: Normocephalic and atraumatic.    Eyes: Pupils are equal, round, and reactive to light. Conjunctivae and EOM are normal.     Cardiovascular:  Normal rate, regular rhythm and normal heart sounds.             Pulmonary/Chest: Effort normal and breath sounds normal.        Abdominal: Soft. Bowel sounds are normal.     Genitourinary:    Vagina, right adnexa and left adnexa normal.      Pelvic exam was performed with patient supine.   Cervix is absent.Uterus is absent.           Musculoskeletal: Normal range of motion and moves all extremeties.       Neurological: She is alert and oriented to person, place, and time.    Skin: Skin is warm and dry.    Psychiatric: She has a normal mood and affect. Her behavior is normal. Thought content normal.         Assessment:     1. Pelvic pain    2. Constipation, unspecified constipation type               Plan:     Rx for Colace.  Dietary recommendations provided

## 2023-12-11 NOTE — TELEPHONE ENCOUNTER
Pt's mother called stating that pt is in a lot of pain/ Informed pt that if she is in that much pain she will need to go to [facility] to be seen/ Pt verbalized understanding./    Called pt to come to clinic per / Pt verbalized understanding.

## 2023-12-15 NOTE — PROCEDURES
GYN Ultrasound Note:    Left ovary 4.18 x 3.21 x 2.76 cm         Impression:  Lateral left ovary seen   No free fluid visualized

## 2024-01-03 ENCOUNTER — OFFICE VISIT (OUTPATIENT)
Dept: OBSTETRICS AND GYNECOLOGY | Facility: CLINIC | Age: 39
End: 2024-01-03
Payer: COMMERCIAL

## 2024-01-03 VITALS
BODY MASS INDEX: 46.86 KG/M2 | HEART RATE: 89 BPM | SYSTOLIC BLOOD PRESSURE: 138 MMHG | DIASTOLIC BLOOD PRESSURE: 92 MMHG | WEIGHT: 293 LBS

## 2024-01-03 DIAGNOSIS — N82.0 VESICO-VAGINAL FISTULA: Primary | ICD-10-CM

## 2024-01-03 PROCEDURE — 3075F SYST BP GE 130 - 139MM HG: CPT | Mod: ,,, | Performed by: OBSTETRICS & GYNECOLOGY

## 2024-01-03 PROCEDURE — 3080F DIAST BP >= 90 MM HG: CPT | Mod: ,,, | Performed by: OBSTETRICS & GYNECOLOGY

## 2024-01-03 PROCEDURE — 99024 POSTOP FOLLOW-UP VISIT: CPT | Mod: ,,, | Performed by: OBSTETRICS & GYNECOLOGY

## 2024-01-03 PROCEDURE — 1159F MED LIST DOCD IN RCRD: CPT | Mod: ,,, | Performed by: OBSTETRICS & GYNECOLOGY

## 2024-01-03 NOTE — PROGRESS NOTES
"  Subjective:      Patient ID: Daja Alfaro is a 39 y.o. female.    Chief Complaint:  Post-op Evaluation      History of Present Illness  HPI  Postoperative Follow-up  Patient presents to the clinic 4 weeks status post total laparoscopic hysterectomy, bilateral oophorectomy, and bilateral salpingectomy for abnormal uterine bleeding and pelvic pain. Eating a regular diet without difficulty. Bowel movements are normal. The patient is not having any pain.  Pt states that she has been leaking a large amount of "fluid" from her vagina. She admits that she was experiencing leaking of fluid at her initial postop visit last month, but didn'e say anything because she thought it was normal.  GYN & OB History  Patient's last menstrual period was 2023 (approximate).   Date of Last Pap: No result found    OB History    Para Term  AB Living   4         4   SAB IAB Ectopic Multiple Live Births                  # Outcome Date GA Lbr Mendoza/2nd Weight Sex Delivery Anes PTL Lv   4             3             2             1                 Review of Systems  Review of Systems   Constitutional:  Negative for activity change, appetite change, fatigue and unexpected weight change.   HENT: Negative.     Respiratory:  Negative for cough, shortness of breath and wheezing.    Cardiovascular:  Negative for chest pain, palpitations and leg swelling.   Gastrointestinal:  Negative for abdominal pain, bloating, blood in stool, constipation, diarrhea, nausea, vomiting and reflux.   Genitourinary:  Negative for bladder incontinence, decreased libido, dysmenorrhea, dyspareunia, dysuria, flank pain, frequency, menorrhagia, menstrual problem, pelvic pain, urgency, vaginal bleeding, vaginal discharge, postcoital bleeding and vaginal odor.   Musculoskeletal:  Negative for back pain.   Integumentary:  Negative for rash, acne, hair changes, mole/lesion, breast mass, nipple discharge, breast skin changes and " breast tenderness.   Neurological:  Negative for headaches.   Psychiatric/Behavioral:  Negative for depression and sleep disturbance. The patient is not nervous/anxious.    Breast: Negative for asymmetry, breast self exam, lump, mass, nipple discharge, skin changes and tenderness         Objective:     Physical Exam:   Constitutional: She is oriented to person, place, and time. She appears well-developed and well-nourished.    HENT:   Head: Normocephalic and atraumatic.    Eyes: Pupils are equal, round, and reactive to light. EOM are normal.     Cardiovascular:  Normal rate, regular rhythm and normal heart sounds.             Pulmonary/Chest: Effort normal and breath sounds normal. Right breast exhibits no inverted nipple, no mass, no nipple discharge, no skin change, no tenderness, no bleeding and no swelling. Left breast exhibits no inverted nipple, no mass, no nipple discharge, no skin change, no tenderness, no bleeding and no swelling. Breasts are symmetrical.        Abdominal: Soft. Bowel sounds are normal.     Genitourinary:    Vagina, uterus, right adnexa and left adnexa normal.   Cervix is normal.           Musculoskeletal: Normal range of motion and moves all extremeties.       Neurological: She is alert and oriented to person, place, and time. She has normal reflexes.     Psychiatric: She has a normal mood and affect. Her behavior is normal. Judgment and thought content normal.         Assessment:     1. Vesico-vaginal fistula       Drainage of copious amount of fluid from the vaginal cuff consistent with urine       Plan:     Pt advised of findings and referral to Uro-Gyn  RTC in 1 week

## 2024-01-11 ENCOUNTER — TELEPHONE (OUTPATIENT)
Dept: OBSTETRICS AND GYNECOLOGY | Facility: CLINIC | Age: 39
End: 2024-01-11
Payer: COMMERCIAL

## 2024-01-11 NOTE — TELEPHONE ENCOUNTER
Spoke with pt/ Informed pt to take ibuprofen OTC as needed for her pain/ Informed pt I am working on her referral, spoke with  office yesterday and they stated they did not receive any referral information. I faxed all pt records to  office on 1/10/2024/ Informed pt that I am going to call in the morning to check on her appointment/ Pt verbalized understanding.

## 2024-01-11 NOTE — TELEPHONE ENCOUNTER
----- Message from Pinky Schneider sent at 1/4/2024  1:24 PM CST -----  Pt came for her appt yesterday and she said she is slighty hurting from yesterdays appt         [phone]

## 2024-01-12 NOTE — TELEPHONE ENCOUNTER
Called pt to update on referral/ Informed pt to keep her appt on Tuesday and if she starts hurting and the Ibuprofen is not helping to go to the ER/ Pt verbalized understanding

## 2024-01-16 NOTE — TELEPHONE ENCOUNTER
Attempted to contact  office no answer/ Due to weather the office will open at 12. Called pt and rescheduled appt for next week per patient request.

## 2024-01-18 NOTE — TELEPHONE ENCOUNTER
Called  office/ They stated they still do not have patient information/ Informed I have faxed her information several times and patient needs to be seen ASAP/ Gave all patient informed to the  for an appt to be made/ They state they next available is in February/ Informed pt can not wait that long/ She stated she will put a nursing encounter in for a early date to be approved/  office should call back with an appt/    Called pt to inform all information regarding her referral/ Informed pt I will be out of the office until Tuesday but if she needs me to call and ask for BULL Allison. Also informed pt to keep her next appt with / Pt verbalized understanding.

## 2024-01-18 NOTE — TELEPHONE ENCOUNTER
----- Message from Pinky Schneider sent at 1/17/2024  1:26 PM CST -----  Pt would like to speak with a nurse .         [phone]

## 2024-01-23 ENCOUNTER — TELEPHONE (OUTPATIENT)
Dept: OBSTETRICS AND GYNECOLOGY | Facility: CLINIC | Age: 39
End: 2024-01-23
Payer: COMMERCIAL

## 2024-01-23 ENCOUNTER — OFFICE VISIT (OUTPATIENT)
Dept: OBSTETRICS AND GYNECOLOGY | Facility: CLINIC | Age: 39
End: 2024-01-23
Payer: COMMERCIAL

## 2024-01-23 VITALS
HEART RATE: 92 BPM | DIASTOLIC BLOOD PRESSURE: 80 MMHG | BODY MASS INDEX: 46.86 KG/M2 | WEIGHT: 293 LBS | SYSTOLIC BLOOD PRESSURE: 128 MMHG

## 2024-01-23 DIAGNOSIS — N82.0 VESICO-VAGINAL FISTULA: Primary | ICD-10-CM

## 2024-01-23 PROCEDURE — 99024 POSTOP FOLLOW-UP VISIT: CPT | Mod: ,,, | Performed by: OBSTETRICS & GYNECOLOGY

## 2024-01-23 PROCEDURE — 3074F SYST BP LT 130 MM HG: CPT | Mod: CPTII,,, | Performed by: OBSTETRICS & GYNECOLOGY

## 2024-01-23 PROCEDURE — 3079F DIAST BP 80-89 MM HG: CPT | Mod: CPTII,,, | Performed by: OBSTETRICS & GYNECOLOGY

## 2024-01-23 PROCEDURE — 1159F MED LIST DOCD IN RCRD: CPT | Mod: CPTII,,, | Performed by: OBSTETRICS & GYNECOLOGY

## 2024-01-23 NOTE — TELEPHONE ENCOUNTER
Pt was seen in clinic today/ Pt has a change in insurance and Dr. Serrano does not accept [employer] insurance/ Called [facility] to get pt referred there.  Called [facility] they do take [employer] insurance/ Only UroGyn they have available is Maisha aRmey NP.  no longer works there/ Informed pt might need surgery but they state she is working under other providers that are stepping in to cover Maisha with patients that need surgery. Her fax number is [phone].    Records sent to fax/

## 2024-01-23 NOTE — PROGRESS NOTES
Subjective:      Patient ID: Daja Alfaro is a 39 y.o. female.    Chief Complaint:  Follow-up (2 wk f/u)      History of Present Illness  Pt states that she was scheduled to see Uro-Gyn ( Dr. Pina) in [place] today, but was [facility] by [facility].           GYN & OB History  Patient's last menstrual period was 2023 (approximate).   Date of Last Pap: No result found    OB History    Para Term  AB Living   4         4   SAB IAB Ectopic Multiple Live Births                  # Outcome Date GA Lbr Mendoza/2nd Weight Sex Delivery Anes PTL Lv   4             3             2             1                 Review of Systems  Review of Systems   Constitutional:  Negative for activity change, appetite change, fatigue and unexpected weight change.   HENT: Negative.     Respiratory:  Negative for cough, shortness of breath and wheezing.    Cardiovascular:  Negative for chest pain, palpitations and leg swelling.   Gastrointestinal:  Negative for abdominal pain, bloating, blood in stool, constipation, diarrhea, nausea, vomiting and reflux.   Genitourinary:  Negative for bladder incontinence, decreased libido, dysmenorrhea, dyspareunia, dysuria, flank pain, frequency, menorrhagia, menstrual problem, pelvic pain, urgency, vaginal bleeding, vaginal discharge, postcoital bleeding and vaginal odor.   Musculoskeletal:  Negative for back pain.   Integumentary:  Negative for rash, acne, hair changes, mole/lesion, breast mass, nipple discharge, breast skin changes and breast tenderness.   Neurological:  Negative for headaches.   Psychiatric/Behavioral:  Negative for depression and sleep disturbance. The patient is not nervous/anxious.    Breast: Negative for asymmetry, breast self exam, lump, mass, nipple discharge, skin changes and tenderness         Objective:     Physical Exam:   Constitutional: She is oriented to person, place, and time. She  appears well-developed and well-nourished.    HENT:   Head: Normocephalic and atraumatic.    Eyes: Pupils are equal, round, and reactive to light. EOM are normal.     Cardiovascular:  Normal rate, regular rhythm and normal heart sounds.             Pulmonary/Chest: Effort normal and breath sounds normal. Right breast exhibits no inverted nipple, no mass, no nipple discharge, no skin change, no tenderness, no bleeding and no swelling. Left breast exhibits no inverted nipple, no mass, no nipple discharge, no skin change, no tenderness, no bleeding and no swelling. Breasts are symmetrical.        Abdominal: Soft. Bowel sounds are normal.     Genitourinary:    Vagina, uterus, right adnexa and left adnexa normal.   Cervix is normal.           Musculoskeletal: Normal range of motion and moves all extremeties.       Neurological: She is alert and oriented to person, place, and time. She has normal reflexes.     Psychiatric: She has a normal mood and affect. Her behavior is normal. Judgment and thought content normal.         Assessment:     1. Vesico-vaginal fistula               Plan:     Pt requires referral to another Uro-Gyn or Urologist that will accept her insurance  We will call her in the next couple days once we have found an location for referral.

## 2024-01-30 ENCOUNTER — TELEPHONE (OUTPATIENT)
Dept: OBSTETRICS AND GYNECOLOGY | Facility: CLINIC | Age: 39
End: 2024-01-30

## 2024-01-30 DIAGNOSIS — N82.0 VESICO-VAGINAL FISTULA: Primary | ICD-10-CM

## 2024-01-30 NOTE — TELEPHONE ENCOUNTER
See new encounter dated for 01/30/2024/ Per  pt needs to see Urology and needs to be a surgical doctor not a NP/

## 2024-01-30 NOTE — TELEPHONE ENCOUNTER
----- Message from Candace Alvarado sent at 1/24/2024  3:15 PM CST -----  Pt called to find out if her appointment with the referred urologist has been made.    [phone] may have to call this number twice, if hasn't gone through the first time

## 2024-02-23 NOTE — TELEPHONE ENCOUNTER
----- Message from Pinky Schneider sent at 1/29/2024  8:31 AM CST -----  Pt has questions about her referral [facility]       [phone]    
Contacted pt to inform we are still waiting to hear back from UroGYN in [place] due to no one taking her insurance. Pt verbalized understanding.  
Faxed records to 's office for review/ Called pt to give updates on appt/ Pt verbalized understanding.  
Patient has appt in March  
Spoke with pt/ Pt still has not heard from her referral/ Called referral department and pts insurance is not accepted at [facility]/ Attempted to contact 's office and all circuits are busy when trying to call/ Called  office and spoke with Rahul and she stated the only office that takes [employer] is [facility]/ she will fax referral form to the office for me/    Spoke with pt and patient is wanting an appt with  and states she will just pay for the appt/ Informed pt I am keeping the referral to [facility] in [place], [place] as well. That way she can go to which ever appt is first/ Pt verbalized understanding/    Called  and they informed me I have to call [facility]/ attempted to contact the number multiple times and the line keeps saying the circuits are busy/ Attempted to fax a note to their office and it will not go through.  
Unable to find a UroGyn that takes patient insurance/ Per verbal order of Corona Mckeon pt is to go to a Urologist. Called  and due to him retiring he recommended the patient see Dr.Neil Morales/ Referral order placed/ Called pt to inform, line was busy  
Never smoker

## 2024-03-06 ENCOUNTER — TELEPHONE (OUTPATIENT)
Dept: OBSTETRICS AND GYNECOLOGY | Facility: CLINIC | Age: 39
End: 2024-03-06

## 2024-03-06 NOTE — TELEPHONE ENCOUNTER
----- Message from Pinky Schneider sent at 2/13/2024 11:02 AM CST -----  Pt has a question about her referral       [phone]

## 2024-08-15 ENCOUNTER — HOSPITAL ENCOUNTER (EMERGENCY)
Facility: HOSPITAL | Age: 39
Discharge: HOME | End: 2024-08-15
Payer: COMMERCIAL

## 2024-08-15 VITALS
HEART RATE: 78 BPM | WEIGHT: 293 LBS | OXYGEN SATURATION: 97 % | RESPIRATION RATE: 18 BRPM | SYSTOLIC BLOOD PRESSURE: 134 MMHG | HEIGHT: 68 IN | DIASTOLIC BLOOD PRESSURE: 91 MMHG | TEMPERATURE: 98 F | BODY MASS INDEX: 44.41 KG/M2

## 2024-08-15 DIAGNOSIS — R10.9 ABDOMINAL PAIN: ICD-10-CM

## 2024-08-15 DIAGNOSIS — N30.00 ACUTE CYSTITIS WITHOUT HEMATURIA: Primary | ICD-10-CM

## 2024-08-15 LAB
BACTERIA #/AREA URNS HPF: ABNORMAL /HPF
BILIRUB UR QL STRIP: NEGATIVE
CLARITY UR: CLEAR
COLOR UR: YELLOW
GLUCOSE UR STRIP-MCNC: NEGATIVE MG/DL
KETONES UR STRIP-SCNC: NEGATIVE MG/DL
LEUKOCYTE ESTERASE UR QL STRIP: ABNORMAL
MUCOUS THREADS #/AREA URNS HPF: ABNORMAL /HPF
NITRITE UR QL STRIP: NEGATIVE
PH UR STRIP: 6 PH UNITS
PROT UR QL STRIP: NEGATIVE
RBC # UR STRIP: ABNORMAL /UL
RBC #/AREA URNS HPF: ABNORMAL /HPF
SP GR UR STRIP: 1.02
SQUAMOUS #/AREA URNS LPF: ABNORMAL /LPF
UROBILINOGEN UR STRIP-ACNC: 0.2 MG/DL
WBC #/AREA URNS HPF: ABNORMAL /HPF

## 2024-08-15 PROCEDURE — 96372 THER/PROPH/DIAG INJ SC/IM: CPT | Performed by: NURSE PRACTITIONER

## 2024-08-15 PROCEDURE — 81003 URINALYSIS AUTO W/O SCOPE: CPT | Performed by: NURSE PRACTITIONER

## 2024-08-15 PROCEDURE — 63600175 PHARM REV CODE 636 W HCPCS: Performed by: NURSE PRACTITIONER

## 2024-08-15 RX ORDER — IBUPROFEN 800 MG/1
800 TABLET ORAL EVERY 6 HOURS PRN
Qty: 30 TABLET | Refills: 0 | Status: SHIPPED | OUTPATIENT
Start: 2024-08-15

## 2024-08-15 RX ORDER — NITROFURANTOIN 25; 75 MG/1; MG/1
100 CAPSULE ORAL 2 TIMES DAILY
Qty: 10 CAPSULE | Refills: 0 | Status: SHIPPED | OUTPATIENT
Start: 2024-08-15 | End: 2024-08-20

## 2024-08-15 RX ORDER — KETOROLAC TROMETHAMINE 30 MG/ML
30 INJECTION, SOLUTION INTRAMUSCULAR; INTRAVENOUS
Status: COMPLETED | OUTPATIENT
Start: 2024-08-15 | End: 2024-08-15

## 2024-08-15 RX ADMIN — KETOROLAC TROMETHAMINE 30 MG: 30 INJECTION, SOLUTION INTRAMUSCULAR at 09:08

## 2024-08-15 NOTE — DISCHARGE INSTRUCTIONS
Follow up with primary care provider when antibiotics are complete.   Increase water intake.   Complete full course of antibiotics.   Ibuprofen as directed for pain.   Continue Tylenol 650 mg every 4-6 hours as needed for pain.  Return to emergency department for any new or worsening symptoms.

## 2024-08-15 NOTE — Clinical Note
"Daja Cookchris" Dominic was seen and treated in our emergency department on 8/15/2024.  She may return to work on 08/19/2024.       If you have any questions or concerns, please don't hesitate to call.      CARLITO Epps RN    "

## 2024-08-15 NOTE — ED PROVIDER NOTES
"Encounter Date: 8/15/2024       History     Chief Complaint   Patient presents with    Abdominal Pain     C/O generalized sharp abdominal pain onset 3 weeks ago after returning to work with increasing in intensity. Has had bladder and and partial hysterectomy surgery since 12/2023     Daja Alfaro is a 39 y.o. Black or  /female presenting to ED with intermittent, diffuse lower and lateral abd pain, described as "sharp", ongoing for 6 months, but worsening since she returned to work 3 weeks ago. Patient had bladder complications from a hysterectomy in 12/2023 and required surgical repair of bladder and ureter in 04/2024. Notes that pain has been present since that time and typically controlled with Tylenol until returning to work. Pain better with standing, worse with sitting and notes that she sits a lot for her job. She also reports urinary frequency. Prone to UTI since surgery. Denies fever, chills. Last BM this AM and on a regular daily routine. Denies N/V. Currently in NAD. VSS at this time.      The history is provided by the patient.     Review of patient's allergies indicates:  No Known Allergies  Past Medical History:   Diagnosis Date    Anemia, unspecified      Past Surgical History:   Procedure Laterality Date    CYSTOSCOPY N/A 12/6/2023    Procedure: CYSTOSCOPY;  Surgeon: Jeremiah Valdivia MD;  [facility];  Service: OB/GYN;  Laterality: N/A;    EXTERNAL EAR SURGERY Right     HAND SURGERY Right     HYSTERECTOMY, TOTAL, LAPAROSCOPIC, WITH SALPINGO-OOPHORECTOMY Right 12/6/2023    Procedure: HYSTERECTOMY,TOTAL,LAPAROSCOPIC,WITH SALPINGO-OOPHORECTOMY;  Surgeon: Jeremiah Valdivia MD;  [facility];  Service: OB/GYN;  Laterality: Right;    TUBAL LIGATION       Family History   Problem Relation Name Age of Onset    Diabetes Mellitus Mother      Diabetes Mellitus Father      Hypertension Maternal Grandfather      Diabetes Paternal Grandmother      Hypertension " Paternal Grandfather       Social History     Tobacco Use    Smoking status: Former     Types: Cigars     Passive exposure: Current    Smokeless tobacco: Never   Substance Use Topics    Alcohol use: Not Currently     Alcohol/week: 2.0 standard drinks of alcohol     Types: 2 Glasses of wine per week     Comment: occ    Drug use: Not Currently     Review of Systems   Constitutional:  Negative for appetite change, chills, diaphoresis and fever.   Respiratory:  Negative for cough and shortness of breath.    Cardiovascular:  Negative for chest pain.   Gastrointestinal:  Positive for abdominal pain. Negative for abdominal distention, blood in stool, constipation, diarrhea, nausea and vomiting.   Genitourinary:  Positive for frequency. Negative for decreased urine volume, difficulty urinating, dysuria, hematuria, pelvic pain and urgency.   Musculoskeletal:  Negative for arthralgias, gait problem and myalgias.   Skin:  Negative for rash and wound.   Neurological:  Negative for dizziness, weakness and light-headedness.   All other systems reviewed and are negative.      Physical Exam     Initial Vitals [08/15/24 0845]   BP Pulse Resp Temp SpO2   (!) 142/97 98 19 98.3 °F (36.8 °C) 97 %      MAP       --         Physical Exam    Nursing note and vitals reviewed.  Constitutional: She appears well-developed and well-nourished. She is not diaphoretic. She is Obese . No distress.   HENT:   Mouth/Throat: Oropharynx is clear and moist.   Cardiovascular:  Normal rate, regular rhythm and normal heart sounds.           Pulmonary/Chest: Breath sounds normal. No respiratory distress.   Abdominal: Abdomen is soft. Bowel sounds are normal. She exhibits no distension. There is no abdominal tenderness. No hernia.   No right CVA tenderness.  No left CVA tenderness. There is no rebound, no guarding, no tenderness at McBurney's point and negative Holland's sign.     Neurological: She is alert and oriented to person, place, and time.   Skin: Skin  is warm and dry. Capillary refill takes less than 2 seconds.         Medical Screening Exam   See Full Note    ED Course   Procedures  Labs Reviewed   URINALYSIS, REFLEX TO URINE CULTURE - Abnormal       Result Value    Color, UA Yellow      Clarity, UA Clear      pH, UA 6.0      Leukocytes, UA Trace (*)     Nitrites, UA Negative      Protein, UA Negative      Glucose, UA Negative      Ketones, UA Negative      Urobilinogen, UA 0.2      Bilirubin, UA Negative      Blood, UA Trace-Intact (*)     Specific [place], UA 1.025     URINALYSIS, MICROSCOPIC - Abnormal    WBC, UA 0-5      RBC, UA 3-5 (*)     Bacteria, UA Few (*)     Squamous Epithelial Cells, UA Few (*)     Mucus, UA Few (*)           Imaging Results    None          Medications   ketorolac injection 30 mg (30 mg Intramuscular Given 8/15/24 1614)     Medical Decision Making  The presentation is NOT consistent with pyelonephritis, sepsis, or sterile pyuria..  Given the large differential diagnosis, the decision making in this case is of high complexity.  After evaluating all of the data points in this case, the presentation of Daja Alfaro is NOT consistent with AAA; Mesenteric Ischemia; Bowel Perforation; Bowel Obstruction; Sigmoid Volvulus; Diverticulitis; Appendicitis; Peritonitis; Cholecystitis, ascending cholangitis or other gallbladder disease; perforated ulcer; significant GI bleeding, splenic rupture/infarction; Hepatic abscess; or other surgical/acute abdomen.  Similarly, this presentation is NOT consistent with ACS or Myocardial Ischemia or cardiac etiology; Pulmonary Embolism; fistula; incarcerated hernia; Pancreatitis, Aortic Dissection; Diabetic Ketoacidosis; Kidney Stone; Ischemic colitis; Psoas or other abscess; Methanol poisoning; Heavy metal toxicity; or porphyria.  Similarly, this case is NOT consistent with Qntr-Cdzw-Lbggel Syndrome, Ectopic Pregnancy, Placental Abruption, PID, Tubo-ovarian abscess, Ovarian Torsion, or STI.  Similarly,  "this presentation is NOT consistent with acute coronary syndrome, pulmonary embolism, dissection, borhaave's, arrythmia, pneumothorax, cardiac tamponade, or other emergent cardiopulmonary condition.  Similarly, this presentation is NOT consistent with pneumonia.    Patient given Toradol in ED with relief of pain. Strict return and follow-up precautions have been given by me personally to the patient/family/caregiver(s). D/c script for Macrobid, Ibuprofen PRN. Work excuse provided.     Data Reviewed/Counseling: I have reviewed the patient's vital signs, nursing notes, and other relevant tests/information. I had a detailed discussion regarding the historical points, exam findings, and any diagnostic results supporting the discharge diagnosis. I also discussed the need for outpatient follow-up and the need to return to the ED if symptoms worsen or if there are any questions or concerns that arise at home.     Dx: Acute cystitis    Amount and/or Complexity of Data Reviewed  Independent Historian:      Details: Daja Alfaro is a 39 y.o. Black or  /female presenting to ED with intermittent, diffuse lower and lateral abd pain, described as "sharp", ongoing for 6 months, but worsening since she returned to work 3 weeks ago. Patient had bladder complications from a hysterectomy in 12/2023 and required surgical repair of bladder and ureter in 04/2024. Notes that pain has been present since that time and typically controlled with Tylenol until returning to work. Pain better with standing, worse with sitting and notes that she sits a lot for her job. She also reports urinary frequency. Prone to UTI since surgery. Denies fever, chills. Last BM this AM and on a regular daily routine. Denies N/V. Currently in NAD. VSS at this time.    Labs:      Details: Urinalysis- few bacteria, 3-5 blood, few mucus    Risk  Prescription drug management.                                      Clinical Impression:   Final " diagnoses:  [R10.9] Abdominal pain  [N30.00] Acute cystitis without hematuria (Primary)        ED Disposition Condition    Discharge Stable          ED Prescriptions       Medication Sig Dispense Start Date End Date Auth. Provider    nitrofurantoin, macrocrystal-monohydrate, (MACROBID) 100 MG capsule Take 1 capsule (100 mg total) by mouth 2 (two) times daily. for 5 days 10 capsule 8/15/2024 8/20/2024 Divya Valles FNP    ibuprofen (ADVIL,MOTRIN) 800 MG tablet Take 1 tablet (800 mg total) by mouth every 6 (six) hours as needed for Pain. 30 tablet 8/15/2024 -- Divya Valles FNP          Follow-up Information    None          Divya Valles, STEWART  08/15/24 1028

## 2024-08-26 NOTE — ADDENDUM NOTE
Encounter addended by: Kamila Savage on: 8/26/2024 8:01 AM   Actions taken: SmartForm saved, Flowsheet accepted, Charge Capture section accepted

## 2024-10-24 ENCOUNTER — OFFICE VISIT (OUTPATIENT)
Dept: FAMILY MEDICINE | Facility: CLINIC | Age: 39
End: 2024-10-24
Payer: COMMERCIAL

## 2024-10-24 VITALS
HEIGHT: 69 IN | TEMPERATURE: 99 F | OXYGEN SATURATION: 100 % | BODY MASS INDEX: 43.4 KG/M2 | HEART RATE: 82 BPM | DIASTOLIC BLOOD PRESSURE: 86 MMHG | RESPIRATION RATE: 18 BRPM | SYSTOLIC BLOOD PRESSURE: 136 MMHG | WEIGHT: 293 LBS

## 2024-10-24 DIAGNOSIS — J02.9 SORE THROAT: ICD-10-CM

## 2024-10-24 DIAGNOSIS — Z20.822 EXPOSURE TO COVID-19 VIRUS: ICD-10-CM

## 2024-10-24 DIAGNOSIS — U07.1 COVID-19: Primary | ICD-10-CM

## 2024-10-24 LAB
CTP QC/QA: YES
CTP QC/QA: YES
POC MOLECULAR INFLUENZA A AGN: NEGATIVE
POC MOLECULAR INFLUENZA B AGN: NEGATIVE
SARS-COV-2 RDRP RESP QL NAA+PROBE: POSITIVE

## 2024-10-24 RX ORDER — ONDANSETRON 4 MG/1
4 TABLET, FILM COATED ORAL EVERY 8 HOURS PRN
Qty: 30 TABLET | Refills: 0 | Status: SHIPPED | OUTPATIENT
Start: 2024-10-24 | End: 2024-10-24

## 2024-10-24 RX ORDER — ONDANSETRON 4 MG/1
4 TABLET, FILM COATED ORAL EVERY 8 HOURS PRN
Qty: 30 TABLET | Refills: 0 | Status: SHIPPED | OUTPATIENT
Start: 2024-10-24

## 2024-10-24 RX ORDER — CHLORPHENIRAMINE MALEATE, DEXTROMETHORPHAN HYDROBROMIDE, AND PHENYLEPHRINE HYDROCHLORIDE 4; 10; 10 MG/1; MG/1; MG/1
1 TABLET, COATED ORAL EVERY 8 HOURS PRN
Qty: 30 TABLET | Refills: 0 | Status: SHIPPED | OUTPATIENT
Start: 2024-10-24 | End: 2024-10-24

## 2024-10-24 RX ORDER — CHLORPHENIRAMINE MALEATE, DEXTROMETHORPHAN HYDROBROMIDE, AND PHENYLEPHRINE HYDROCHLORIDE 4; 10; 10 MG/1; MG/1; MG/1
1 TABLET, COATED ORAL EVERY 8 HOURS PRN
Qty: 30 TABLET | Refills: 0 | Status: SHIPPED | OUTPATIENT
Start: 2024-10-24

## 2024-10-24 NOTE — PROGRESS NOTES
Clinic Note    Patient Name: Daja Alfaro  : 1985  MRN: [id number]    Chief Complaint   Patient presents with    Health Maintenance     Hepatitis C Screening Never done  Lipid Panel Never done  HIV Screening Never done  TETANUS VACCINE Never done  Influenza Vaccine(1) due on 2024  COVID-19 Vaccine( season) due on 2024        HPI:    Ms. Daja Alfaro is a 39 y.o. female who presents to clinic today with CC of nausea/vomiting and upset stomach X several days. Reports she has felt chilled. 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  Denies fever.  Patient is, otherwise, without complaints.     Medications:  Medication List with Changes/Refills   New Medications    CHLORPHENIRAMINE-PHENYLEPH-DM (ED A-HIST DM) 4-10-10 MG TAB    Take 1 tablet by mouth every 8 (eight) hours as needed (congestion or cough).    ONDANSETRON (ZOFRAN) 4 MG TABLET    Take 1 tablet (4 mg total) by mouth every 8 (eight) hours as needed for Nausea.   Current Medications    DOCUSATE SODIUM (COLACE) 100 MG CAPSULE    Take 1 capsule (100 mg total) by mouth daily as needed for Constipation.    FEROSUL 325 MG (65 MG IRON) TAB TABLET    Take by mouth once daily.    IBUPROFEN (ADVIL,MOTRIN) 800 MG TABLET    Take 1 tablet (800 mg total) by mouth every 6 (six) hours as needed for Pain.    MV-MIN/IRON/FOLIC/CALCIUM/VITK (WOMEN'S MULTIVITAMIN ORAL)    Take by mouth.        Allergies: Aspirin      Past Medical History:    Past Medical History:   Diagnosis Date    Anemia, unspecified        Past Surgical History:    Past Surgical History:   Procedure Laterality Date    CYSTOSCOPY N/A 2023    Procedure: CYSTOSCOPY;  Surgeon: Jeremiah Valdivia MD;  Location: [facility];  Service: OB/GYN;  Laterality: N/A;    EXTERNAL EAR SURGERY Right     HAND SURGERY Right     HYSTERECTOMY, TOTAL, LAPAROSCOPIC, WITH SALPINGO-OOPHORECTOMY Right 2023    Procedure: HYSTERECTOMY,TOTAL,LAPAROSCOPIC,WITH  "SALPINGO-OOPHORECTOMY;  Surgeon: Jeremiah Valdivia MD;  Location: [facility];  Service: OB/GYN;  Laterality: Right;    TUBAL LIGATION           Social History:    Social History     Tobacco Use   Smoking Status Former    Types: Cigars    Passive exposure: Current   Smokeless Tobacco Never     Social History     Substance and Sexual Activity   Alcohol Use Not Currently    Alcohol/week: 2.0 standard drinks of alcohol    Types: 2 Glasses of wine per week    Comment: occ     Social History     Substance and Sexual Activity   Drug Use Not Currently         Family History:    Family History   Problem Relation Name Age of Onset    Diabetes Mellitus Mother      Diabetes Mellitus Father      Hypertension Maternal Grandfather      Diabetes Paternal Grandmother      Hypertension Paternal Grandfather         Review of Systems:    Review of Systems   Constitutional:  Positive for chills. Negative for appetite change, fatigue, fever and unexpected weight change.   Eyes:  Negative for visual disturbance.   Respiratory:  Negative for cough and shortness of breath.    Cardiovascular:  Negative for chest pain and leg swelling.   Gastrointestinal:  Positive for diarrhea, nausea and vomiting. Negative for abdominal pain, change in bowel habit and constipation.   Musculoskeletal:  Negative for arthralgias.   Integumentary:  Negative for rash.   Neurological:  Negative for dizziness and headaches.   Psychiatric/Behavioral:  The patient is not nervous/anxious.         Vitals:    Vitals:    10/24/24 1415   BP: 136/86   BP Location: Right arm   Patient Position: Sitting   Pulse: 82   Resp: 18   Temp: 99.1 °F (37.3 °C)   TempSrc: Oral   SpO2: 100%   Weight: (!) 143.3 kg (316 lb)   Height: 5' 8.5" (1.74 m)       Body mass index is 47.35 kg/m².    Wt Readings from Last 3 Encounters:   10/24/24 1415 (!) 143.3 kg (316 lb)   08/15/24 0845 (!) 141.1 kg (311 lb)   01/23/24 0913 (!) 139.8 kg (308 lb 3.2 oz)        Physical Exam:    Physical " Exam  Constitutional:       General: She is not in acute distress.     Appearance: Normal appearance. She is obese.   HENT:      Mouth/Throat:      Mouth: Mucous membranes are moist.      Pharynx: Oropharynx is clear.   Eyes:      Conjunctiva/sclera: Conjunctivae normal.   Cardiovascular:      Rate and Rhythm: Normal rate and regular rhythm.      Heart sounds: Normal heart sounds. No murmur heard.  Pulmonary:      Effort: Pulmonary effort is normal. No respiratory distress.      Breath sounds: Normal breath sounds. No wheezing, rhonchi or rales.   Abdominal:      General: Bowel sounds are normal.      Palpations: Abdomen is soft.      Tenderness: There is no abdominal tenderness. There is no right CVA tenderness, left CVA tenderness, guarding or rebound.   Musculoskeletal:      Cervical back: Neck supple.      Right lower leg: No edema.      Left lower leg: No edema.   Skin:     Findings: No rash.   Neurological:      General: No focal deficit present.      Mental Status: She is alert. Mental status is at baseline.   Psychiatric:         Mood and Affect: Mood normal.         Results:  COVID-19: Positive  Influenza A/B: negative  Rapid Strep: negative    Assessment/Plan:   1. COVID-19  -     chlorpheniramine-phenyleph-DM (ED A-HIST DM) 4-10-10 mg Tab; Take 1 tablet by mouth every 8 (eight) hours as needed (congestion or cough).  Dispense: 30 tablet; Refill: 0  -     ondansetron (ZOFRAN) 4 MG tablet; Take 1 tablet (4 mg total) by mouth every 8 (eight) hours as needed for Nausea.  Dispense: 30 tablet; Refill: 0    2. Sore throat  -     POCT Strep A, Molecular    3. Exposure to COVID-19 virus  -     POCT COVID-19 Rapid Screening  -     POCT Influenza A/B Molecular         Active Problem List with Overview Notes    Diagnosis Date Noted    Status post laparoscopic hysterectomy 12/06/2023          RTC prn if symptoms worsen or fail to resolve.  Patient voiced understanding and is agreeable to plan.      Farrah Tidwell  MD Jody    [facility]

## 2024-10-24 NOTE — LETTER
October 24, 2024      [facility]  [address] [place] [address]  Phone: [phone]  Fax: [phone]       Patient: Daja Alfaro   YOB: 1985  Date of Visit: 10/24/2024    To Whom It May Concern:    Mckenzie Alfaro  was at [facility] on 10/24/2024. Above patient has test positive for Covid 19.The patient may return to work Tuesday 10/29/2024 If fever free for 24 hours can return back to work with no restrictions  If you have any questions or concerns, or if I can be of further assistance, please do not hesitate to contact me.    Sincerely,    Dr. Esther Montero, LOUIEN

## 2024-12-05 ENCOUNTER — OFFICE VISIT (OUTPATIENT)
Dept: FAMILY MEDICINE | Facility: CLINIC | Age: 39
End: 2024-12-05
Payer: COMMERCIAL

## 2024-12-05 VITALS
WEIGHT: 293 LBS | DIASTOLIC BLOOD PRESSURE: 76 MMHG | RESPIRATION RATE: 16 BRPM | OXYGEN SATURATION: 95 % | HEIGHT: 69 IN | TEMPERATURE: 98 F | SYSTOLIC BLOOD PRESSURE: 119 MMHG | HEART RATE: 78 BPM | BODY MASS INDEX: 43.4 KG/M2

## 2024-12-05 DIAGNOSIS — J32.9 SINUSITIS, UNSPECIFIED CHRONICITY, UNSPECIFIED LOCATION: Primary | ICD-10-CM

## 2024-12-05 PROCEDURE — 1160F RVW MEDS BY RX/DR IN RCRD: CPT | Mod: CPTII,,, | Performed by: NURSE PRACTITIONER

## 2024-12-05 PROCEDURE — 3044F HG A1C LEVEL LT 7.0%: CPT | Mod: CPTII,,, | Performed by: NURSE PRACTITIONER

## 2024-12-05 PROCEDURE — 1159F MED LIST DOCD IN RCRD: CPT | Mod: CPTII,,, | Performed by: NURSE PRACTITIONER

## 2024-12-05 PROCEDURE — 99213 OFFICE O/P EST LOW 20 MIN: CPT | Mod: 25,,, | Performed by: NURSE PRACTITIONER

## 2024-12-05 PROCEDURE — 3078F DIAST BP <80 MM HG: CPT | Mod: CPTII,,, | Performed by: NURSE PRACTITIONER

## 2024-12-05 PROCEDURE — 3074F SYST BP LT 130 MM HG: CPT | Mod: CPTII,,, | Performed by: NURSE PRACTITIONER

## 2024-12-05 PROCEDURE — 3008F BODY MASS INDEX DOCD: CPT | Mod: CPTII,,, | Performed by: NURSE PRACTITIONER

## 2024-12-05 PROCEDURE — 96372 THER/PROPH/DIAG INJ SC/IM: CPT | Mod: ,,, | Performed by: NURSE PRACTITIONER

## 2024-12-05 RX ORDER — CEFTRIAXONE 1 G/1
1 INJECTION, POWDER, FOR SOLUTION INTRAMUSCULAR; INTRAVENOUS
Status: COMPLETED | OUTPATIENT
Start: 2024-12-05 | End: 2024-12-05

## 2024-12-05 RX ORDER — CHLORPHENIRAMINE MALEATE, DEXTROMETHORPHAN HYDROBROMIDE, AND PHENYLEPHRINE HYDROCHLORIDE 4; 10; 10 MG/1; MG/1; MG/1
1 TABLET, COATED ORAL EVERY 8 HOURS PRN
Qty: 30 TABLET | Refills: 0 | Status: SHIPPED | OUTPATIENT
Start: 2024-12-05

## 2024-12-05 RX ORDER — DEXAMETHASONE SODIUM PHOSPHATE 4 MG/ML
4 INJECTION, SOLUTION INTRA-ARTICULAR; INTRALESIONAL; INTRAMUSCULAR; INTRAVENOUS; SOFT TISSUE
Status: COMPLETED | OUTPATIENT
Start: 2024-12-05 | End: 2024-12-05

## 2024-12-05 RX ORDER — METHYLPREDNISOLONE ACETATE 40 MG/ML
40 INJECTION, SUSPENSION INTRA-ARTICULAR; INTRALESIONAL; INTRAMUSCULAR; SOFT TISSUE
Status: COMPLETED | OUTPATIENT
Start: 2024-12-05 | End: 2024-12-05

## 2024-12-05 RX ORDER — AZITHROMYCIN 250 MG/1
TABLET, FILM COATED ORAL
Qty: 6 TABLET | Refills: 0 | Status: SHIPPED | OUTPATIENT
Start: 2024-12-05 | End: 2024-12-10

## 2024-12-05 RX ADMIN — CEFTRIAXONE 1 G: 1 INJECTION, POWDER, FOR SOLUTION INTRAMUSCULAR; INTRAVENOUS at 08:12

## 2024-12-05 RX ADMIN — METHYLPREDNISOLONE ACETATE 40 MG: 40 INJECTION, SUSPENSION INTRA-ARTICULAR; INTRALESIONAL; INTRAMUSCULAR; SOFT TISSUE at 08:12

## 2024-12-05 RX ADMIN — DEXAMETHASONE SODIUM PHOSPHATE 4 MG: 4 INJECTION, SOLUTION INTRA-ARTICULAR; INTRALESIONAL; INTRAMUSCULAR; INTRAVENOUS; SOFT TISSUE at 08:12

## 2024-12-05 NOTE — PROGRESS NOTES
Clinic Note    Daja Alfaro is a 39 y.o. female     Chief Complaint:   Chief Complaint   Patient presents with    Sinus Problem    Nasal Congestion        Subjective:    Patient complains of sinus symptoms X 1 week. States started as nasal congestion, sinus pressure, and sore throat. States yesterday symptoms are now in chest. Admits to productive cough with yellow sputum. Denies fever or body aches. Ear pressure with popping when sneezing.     Sinus Problem  Associated symptoms include congestion, coughing, sinus pressure, sneezing and a sore throat. Pertinent negatives include no ear pain, headaches or shortness of breath.        Allergies:   Review of patient's allergies indicates:   Allergen Reactions    Aspirin Hives        Past Medical History:  Past Medical History:   Diagnosis Date    Anemia, unspecified         Current Medications:    Current Outpatient Medications:     docusate sodium (COLACE) 100 MG capsule, Take 1 capsule (100 mg total) by mouth daily as needed for Constipation., Disp: 30 capsule, Rfl: 1    FEROSUL 325 mg (65 mg iron) Tab tablet, Take by mouth once daily., Disp: , Rfl:     ibuprofen (ADVIL,MOTRIN) 800 MG tablet, Take 1 tablet (800 mg total) by mouth every 6 (six) hours as needed for Pain., Disp: 30 tablet, Rfl: 0    mv-min/iron/folic/calcium/vitK (WOMEN'S MULTIVITAMIN ORAL), Take by mouth., Disp: , Rfl:     ondansetron (ZOFRAN) 4 MG tablet, Take 1 tablet (4 mg total) by mouth every 8 (eight) hours as needed for Nausea., Disp: 30 tablet, Rfl: 0    azithromycin (Z-MICHELLE) 250 MG tablet, Take 2 tablets by mouth on day 1; Take 1 tablet by mouth on days 2-5, Disp: 6 tablet, Rfl: 0    chlorpheniramine-phenyleph-DM (ED A-HIST DM) 4-10-10 mg Tab, Take 1 tablet by mouth every 8 (eight) hours as needed (congestion or cough)., Disp: 30 tablet, Rfl: 0    Current Facility-Administered Medications:     cefTRIAXone injection 1 g, 1 g, Intramuscular, 1 time in Clinic/HOD, Janell Fernando FNP     "dexAMETHasone injection 4 mg, 4 mg, Intramuscular, 1 time in Clinic/HOD, Janell Fernando FNP    methylPREDNISolone acetate injection 40 mg, 40 mg, Intramuscular, 1 time in Clinic/HOD, Janell Fernando FNP       Review of Systems   Constitutional:  Negative for fever.   HENT:  Positive for nasal congestion, postnasal drip, rhinorrhea, sinus pressure/congestion, sneezing and sore throat. Negative for ear discharge and ear pain.    Respiratory:  Positive for cough. Negative for shortness of breath.    Cardiovascular:  Negative for chest pain, palpitations and leg swelling.   Gastrointestinal:  Negative for abdominal pain, constipation, diarrhea, nausea and vomiting.   Genitourinary:  Negative for dysuria.   Neurological:  Negative for headaches.          Objective:    /76 (BP Location: Left arm, Patient Position: Sitting)   Pulse 78   Temp 98.2 °F (36.8 °C) (Oral)   Resp 16   Ht 5' 8.5" (1.74 m)   Wt (!) 146.2 kg (322 lb 6.4 oz)   LMP 11/22/2023 (Approximate)   SpO2 95%   BMI 48.31 kg/m²      Physical Exam  Constitutional:       Appearance: Normal appearance. She is obese.   HENT:      Right Ear: Tympanic membrane normal.      Left Ear: Tympanic membrane normal.      Nose: Congestion and rhinorrhea present.      Mouth/Throat:      Pharynx: No oropharyngeal exudate or posterior oropharyngeal erythema.   Eyes:      Extraocular Movements: Extraocular movements intact.   Cardiovascular:      Rate and Rhythm: Normal rate and regular rhythm.      Pulses: Normal pulses.      Heart sounds: Normal heart sounds.   Pulmonary:      Effort: Pulmonary effort is normal.      Breath sounds: Normal breath sounds.   Neurological:      Mental Status: She is alert and oriented to person, place, and time.          Assessment and Plan:    1. Sinusitis, unspecified chronicity, unspecified location         Sinusitis, unspecified chronicity, unspecified location  -     chlorpheniramine-phenyleph-DM (ED A-HIST DM) 4-10-10 mg " Tab; Take 1 tablet by mouth every 8 (eight) hours as needed (congestion or cough).  Dispense: 30 tablet; Refill: 0  -     dexAMETHasone injection 4 mg  -     methylPREDNISolone acetate injection 40 mg  -     cefTRIAXone injection 1 g  -     azithromycin (Z-MICHELLE) 250 MG tablet; Take 2 tablets by mouth on day 1; Take 1 tablet by mouth on days 2-5  Dispense: 6 tablet; Refill: 0          There are no Patient Instructions on file for this visit.   Follow up if symptoms worsen or fail to improve.

## 2024-12-05 NOTE — LETTER
December 5, 2024      [facility]  [address] [place] [address]  Phone: [phone]  Fax: [phone]       Patient: Daja Alfaro   YOB: 1985  Date of Visit: 12/05/2024    To Whom It May Concern:    Mckenzie Alfaro  was at [facility] on 12/05/2024. The patient may return to work on today12/05/2024 with no restrictions. If you have any questions or concerns, or if I can be of further assistance, please do not hesitate to contact me.    Sincerely,    Janell Montero LPN

## 2025-01-02 ENCOUNTER — OFFICE VISIT (OUTPATIENT)
Dept: FAMILY MEDICINE | Facility: CLINIC | Age: 40
End: 2025-01-02
Payer: COMMERCIAL

## 2025-01-02 VITALS
RESPIRATION RATE: 18 BRPM | HEART RATE: 87 BPM | WEIGHT: 293 LBS | HEIGHT: 69 IN | TEMPERATURE: 98 F | SYSTOLIC BLOOD PRESSURE: 147 MMHG | DIASTOLIC BLOOD PRESSURE: 89 MMHG | OXYGEN SATURATION: 98 % | BODY MASS INDEX: 43.4 KG/M2

## 2025-01-02 DIAGNOSIS — F32.A DEPRESSION, UNSPECIFIED DEPRESSION TYPE: ICD-10-CM

## 2025-01-02 DIAGNOSIS — M06.9 RHEUMATOID ARTHRITIS, INVOLVING UNSPECIFIED SITE, UNSPECIFIED WHETHER RHEUMATOID FACTOR PRESENT: ICD-10-CM

## 2025-01-02 DIAGNOSIS — F41.9 ANXIETY: Primary | ICD-10-CM

## 2025-01-02 PROCEDURE — 3077F SYST BP >= 140 MM HG: CPT | Mod: CPTII,,, | Performed by: NURSE PRACTITIONER

## 2025-01-02 PROCEDURE — 3079F DIAST BP 80-89 MM HG: CPT | Mod: CPTII,,, | Performed by: NURSE PRACTITIONER

## 2025-01-02 PROCEDURE — 3008F BODY MASS INDEX DOCD: CPT | Mod: CPTII,,, | Performed by: NURSE PRACTITIONER

## 2025-01-02 PROCEDURE — 99213 OFFICE O/P EST LOW 20 MIN: CPT | Mod: ,,, | Performed by: NURSE PRACTITIONER

## 2025-01-02 PROCEDURE — 1160F RVW MEDS BY RX/DR IN RCRD: CPT | Mod: CPTII,,, | Performed by: NURSE PRACTITIONER

## 2025-01-02 PROCEDURE — 1159F MED LIST DOCD IN RCRD: CPT | Mod: CPTII,,, | Performed by: NURSE PRACTITIONER

## 2025-01-02 RX ORDER — PAROXETINE 10 MG/1
10 TABLET, FILM COATED ORAL EVERY MORNING
Qty: 30 TABLET | Refills: 1 | Status: SHIPPED | OUTPATIENT
Start: 2025-01-02 | End: 2026-01-02

## 2025-01-02 RX ORDER — ACETAMINOPHEN 500 MG
500 TABLET ORAL EVERY 6 HOURS PRN
COMMUNITY

## 2025-01-02 NOTE — PROGRESS NOTES
Clinic Note    Daja Alfaro is a 39 y.o. female     Chief Complaint:   Chief Complaint   Patient presents with    Knee Pain     Left knee pain. States she was told a while back that she has rheumatoid arthritis in that knee    Depression     States she is badly depression. States she had a partial hysterectomy in 12/23 and then another surgery 4/23 to correct that surgery and has pretty much been off work since then so she has a lot of anxiety and depression        Subjective:    Patient reports anxiety and depression. Patient states she had a partial hysterectomy with major complication back in April 2024. Patient states she still isn't completely healed. Patient is established with urology. Patient states she has taken zoloft back when teenager. States she did not like the way it made her feel. Denies suicidal or homicidal ideations.   Patient admits to chronic knee pain. States she has had imaging in past. States she has had rheumatoid panel and positive ra. Positive family hx of ra.         Allergies:   Review of patient's allergies indicates:   Allergen Reactions    Aspirin Hives        Past Medical History:  Past Medical History:   Diagnosis Date    Anemia, unspecified         Current Medications:    Current Outpatient Medications:     acetaminophen (TYLENOL) 500 MG tablet, Take 500 mg by mouth every 6 (six) hours as needed., Disp: , Rfl:     FEROSUL 325 mg (65 mg iron) Tab tablet, Take by mouth once daily., Disp: , Rfl:     ibuprofen (ADVIL,MOTRIN) 800 MG tablet, Take 1 tablet (800 mg total) by mouth every 6 (six) hours as needed for Pain., Disp: 30 tablet, Rfl: 0    mv-min/iron/folic/calcium/vitK (WOMEN'S MULTIVITAMIN ORAL), Take by mouth., Disp: , Rfl:     ondansetron (ZOFRAN) 4 MG tablet, Take 1 tablet (4 mg total) by mouth every 8 (eight) hours as needed for Nausea., Disp: 30 tablet, Rfl: 0    paroxetine (PAXIL) 10 MG tablet, Take 1 tablet (10 mg total) by mouth every morning., Disp: 30 tablet, Rfl:  "1       Review of Systems   Constitutional:  Negative for fever.   Respiratory:  Negative for cough and shortness of breath.    Cardiovascular:  Negative for chest pain, palpitations and leg swelling.   Gastrointestinal:  Negative for abdominal pain, constipation, diarrhea, nausea and vomiting.   Genitourinary:  Negative for dysuria.   Neurological:  Negative for headaches.   Psychiatric/Behavioral:  Positive for depressed mood and sleep disturbance. Negative for self-injury and suicidal ideas. The patient is nervous/anxious.           Objective:    BP (!) 147/89 (BP Location: Left arm, Patient Position: Sitting)   Pulse 87   Temp 98 °F (36.7 °C) (Oral)   Resp 18   Ht 5' 8.5" (1.74 m)   Wt (!) 146.2 kg (322 lb 6 oz)   LMP 11/22/2023 (Approximate)   SpO2 98%   BMI 48.30 kg/m²      Physical Exam  Constitutional:       Appearance: Normal appearance. She is obese.   Eyes:      Extraocular Movements: Extraocular movements intact.   Cardiovascular:      Rate and Rhythm: Normal rate and regular rhythm.      Pulses: Normal pulses.      Heart sounds: Normal heart sounds.   Pulmonary:      Effort: Pulmonary effort is normal.      Breath sounds: Normal breath sounds.   Neurological:      Mental Status: She is alert and oriented to person, place, and time.   Psychiatric:         Mood and Affect: Affect is tearful.         Behavior: Behavior is cooperative.          Assessment and Plan:    1. Anxiety    2. Rheumatoid arthritis, involving unspecified site, unspecified whether rheumatoid factor present    3. Depression, unspecified depression type         Anxiety  -     paroxetine (PAXIL) 10 MG tablet; Take 1 tablet (10 mg total) by mouth every morning.  Dispense: 30 tablet; Refill: 1  -     Ambulatory referral/consult to Psychiatry; Future; Expected date: 01/09/2025  -new med. Discussed side effects and precautions    Rheumatoid arthritis, involving unspecified site, unspecified whether rheumatoid factor present  -     " Ambulatory referral/consult to Rheumatology; Future; Expected date: 01/09/2025  -patient states she has rheumatoid arthritis in left knee. States she has had previous imaging and rheumatoid panel    Depression, unspecified depression type  -     paroxetine (PAXIL) 10 MG tablet; Take 1 tablet (10 mg total) by mouth every morning.  Dispense: 30 tablet; Refill: 1  -     Ambulatory referral/consult to Psychiatry; Future; Expected date: 01/09/2025          There are no Patient Instructions on file for this visit.   Follow up in about 4 weeks (around 1/30/2025), or if symptoms worsen or fail to improve.

## 2025-01-24 DIAGNOSIS — M06.9 RHEUMATOID ARTHRITIS INVOLVING MULTIPLE SITES, UNSPECIFIED WHETHER RHEUMATOID FACTOR PRESENT: Primary | ICD-10-CM

## 2025-01-30 ENCOUNTER — OFFICE VISIT (OUTPATIENT)
Dept: FAMILY MEDICINE | Facility: CLINIC | Age: 40
End: 2025-01-30
Payer: COMMERCIAL

## 2025-01-30 VITALS
RESPIRATION RATE: 18 BRPM | OXYGEN SATURATION: 96 % | TEMPERATURE: 100 F | HEART RATE: 106 BPM | BODY MASS INDEX: 43.4 KG/M2 | DIASTOLIC BLOOD PRESSURE: 88 MMHG | HEIGHT: 69 IN | WEIGHT: 293 LBS | SYSTOLIC BLOOD PRESSURE: 137 MMHG

## 2025-01-30 DIAGNOSIS — R50.9 FEVER, UNSPECIFIED FEVER CAUSE: ICD-10-CM

## 2025-01-30 DIAGNOSIS — F32.A DEPRESSION, UNSPECIFIED DEPRESSION TYPE: ICD-10-CM

## 2025-01-30 DIAGNOSIS — U07.1 COVID: Primary | ICD-10-CM

## 2025-01-30 DIAGNOSIS — F41.9 ANXIETY: ICD-10-CM

## 2025-01-30 PROCEDURE — 99213 OFFICE O/P EST LOW 20 MIN: CPT | Mod: ,,, | Performed by: NURSE PRACTITIONER

## 2025-01-30 PROCEDURE — 3075F SYST BP GE 130 - 139MM HG: CPT | Mod: CPTII,,, | Performed by: NURSE PRACTITIONER

## 2025-01-30 PROCEDURE — 1159F MED LIST DOCD IN RCRD: CPT | Mod: CPTII,,, | Performed by: NURSE PRACTITIONER

## 2025-01-30 PROCEDURE — 87635 SARS-COV-2 COVID-19 AMP PRB: CPT | Mod: QW,,, | Performed by: NURSE PRACTITIONER

## 2025-01-30 PROCEDURE — 3008F BODY MASS INDEX DOCD: CPT | Mod: CPTII,,, | Performed by: NURSE PRACTITIONER

## 2025-01-30 PROCEDURE — 3079F DIAST BP 80-89 MM HG: CPT | Mod: CPTII,,, | Performed by: NURSE PRACTITIONER

## 2025-01-30 PROCEDURE — 87502 INFLUENZA DNA AMP PROBE: CPT | Mod: QW,,, | Performed by: NURSE PRACTITIONER

## 2025-01-30 RX ORDER — PAROXETINE 10 MG/1
10 TABLET, FILM COATED ORAL EVERY MORNING
Qty: 90 TABLET | Refills: 1 | Status: SHIPPED | OUTPATIENT
Start: 2025-01-30 | End: 2026-01-30

## 2025-01-30 NOTE — PROGRESS NOTES
Clinic Note    Daja Alfaro is a 40 y.o. female     Chief Complaint:   Chief Complaint   Patient presents with    Follow-up     4 week follow up    Nasal Congestion    Gynecologic Exam    Sore Throat    Fever    Shortness of Breath        Subjective:    Patient complains of body aches, fever, sore throat, and cough. Symptoms started yesterday.  Patient also here for 4 week f/u on paxil. Patient reports mood has improved. Denies suicidal or homicidal ideations. Denies adverse side effects. Did not receive appt for psych.     Follow-up  Associated symptoms include congestion, coughing, a fever and a sore throat.   Gynecologic Exam  Associated symptoms include a fever and a sore throat.   Sore Throat   Associated symptoms include congestion and coughing. Pertinent negatives include no shortness of breath.   Fever   Associated symptoms include congestion, coughing and a sore throat.   Shortness of Breath  Associated symptoms include a fever and a sore throat.        Allergies:   Review of patient's allergies indicates:   Allergen Reactions    Aspirin Hives        Past Medical History:  Past Medical History:   Diagnosis Date    Anemia, unspecified         Current Medications:    Current Outpatient Medications:     acetaminophen (TYLENOL) 500 MG tablet, Take 500 mg by mouth every 6 (six) hours as needed., Disp: , Rfl:     chlorpheniramine-phenyleph-DM 4-10-10 mg Tab, Take 1 tablet by mouth every 6 (six) hours as needed., Disp: 30 tablet, Rfl: 0    FEROSUL 325 mg (65 mg iron) Tab tablet, Take by mouth once daily., Disp: , Rfl:     ibuprofen (ADVIL,MOTRIN) 800 MG tablet, Take 1 tablet (800 mg total) by mouth every 6 (six) hours as needed for Pain., Disp: 30 tablet, Rfl: 0    mv-min/iron/folic/calcium/vitK (WOMEN'S MULTIVITAMIN ORAL), Take by mouth., Disp: , Rfl:     ondansetron (ZOFRAN) 4 MG tablet, Take 1 tablet (4 mg total) by mouth every 8 (eight) hours as needed for Nausea., Disp: 30 tablet, Rfl: 0    paroxetine  "(PAXIL) 10 MG tablet, Take 1 tablet (10 mg total) by mouth every morning., Disp: 90 tablet, Rfl: 1       Review of Systems   Constitutional:  Positive for fever.   HENT:  Positive for nasal congestion and sore throat.    Respiratory:  Positive for cough. Negative for shortness of breath.    Psychiatric/Behavioral:  Negative for self-injury and suicidal ideas.           Objective:    /88 (BP Location: Left arm, Patient Position: Sitting)   Pulse 106   Temp 100 °F (37.8 °C) (Oral)   Resp 18   Ht 5' 8.5" (1.74 m)   Wt (!) 147.9 kg (326 lb)   LMP 11/22/2023 (Approximate)   SpO2 96%   BMI 48.85 kg/m²      Physical Exam  Constitutional:       Appearance: Normal appearance.   Eyes:      Extraocular Movements: Extraocular movements intact.   Cardiovascular:      Rate and Rhythm: Normal rate and regular rhythm.      Pulses: Normal pulses.      Heart sounds: Normal heart sounds.   Pulmonary:      Effort: Pulmonary effort is normal.      Breath sounds: Normal breath sounds.   Neurological:      Mental Status: She is alert and oriented to person, place, and time.   Psychiatric:         Mood and Affect: Mood normal.          Assessment and Plan:    1. COVID    2. Fever, unspecified fever cause    3. Anxiety    4. Depression, unspecified depression type         COVID  -     chlorpheniramine-phenyleph-DM 4-10-10 mg Tab; Take 1 tablet by mouth every 6 (six) hours as needed.  Dispense: 30 tablet; Refill: 0  -discussed viral illness and quarantine measures  -tylenol prn pain /fever  Fever, unspecified fever cause  -     POCT Influenza A/B Molecular  -     POCT COVID-19 Rapid Screening    Anxiety  -     paroxetine (PAXIL) 10 MG tablet; Take 1 tablet (10 mg total) by mouth every morning.  Dispense: 90 tablet; Refill: 1    Depression, unspecified depression type  -     paroxetine (PAXIL) 10 MG tablet; Take 1 tablet (10 mg total) by mouth every morning.  Dispense: 90 tablet; Refill: 1  -will check on psych appt    Results " for orders placed or performed in visit on 01/30/25   POCT Influenza A/B Molecular   Result Value Ref Range    POC Molecular Influenza A Ag Negative Negative    POC Molecular Influenza B Ag Negative Negative     Acceptable Yes      Results for orders placed or performed in visit on 01/30/25   POCT COVID-19 Rapid Screening   Result Value Ref Range    POC Rapid COVID Positive (A) Negative     Acceptable Yes        There are no Patient Instructions on file for this visit.   Follow up in about 4 months (around 5/30/2025), or if symptoms worsen or fail to improve.

## 2025-03-12 ENCOUNTER — HOSPITAL ENCOUNTER (EMERGENCY)
Facility: HOSPITAL | Age: 40
Discharge: HOME | End: 2025-03-12
Payer: COMMERCIAL

## 2025-03-12 VITALS
RESPIRATION RATE: 18 BRPM | TEMPERATURE: 98 F | DIASTOLIC BLOOD PRESSURE: 98 MMHG | BODY MASS INDEX: 44.41 KG/M2 | SYSTOLIC BLOOD PRESSURE: 153 MMHG | HEART RATE: 89 BPM | WEIGHT: 293 LBS | OXYGEN SATURATION: 98 % | HEIGHT: 68 IN

## 2025-03-12 DIAGNOSIS — M25.562 ACUTE PAIN OF LEFT KNEE: Primary | ICD-10-CM

## 2025-03-12 DIAGNOSIS — T14.90XA TRAUMA: ICD-10-CM

## 2025-03-12 PROCEDURE — 99283 EMERGENCY DEPT VISIT LOW MDM: CPT | Mod: GF,,, | Performed by: NURSE PRACTITIONER

## 2025-03-12 PROCEDURE — 25000003 PHARM REV CODE 250: Performed by: NURSE PRACTITIONER

## 2025-03-12 PROCEDURE — 99283 EMERGENCY DEPT VISIT LOW MDM: CPT | Mod: 25

## 2025-03-12 RX ORDER — ACETAMINOPHEN 500 MG
1000 TABLET ORAL
Status: COMPLETED | OUTPATIENT
Start: 2025-03-12 | End: 2025-03-12

## 2025-03-12 RX ADMIN — ACETAMINOPHEN 1000 MG: 500 TABLET ORAL at 12:03

## 2025-03-12 NOTE — ED PROVIDER NOTES
Encounter Date: 3/12/2025       History     Chief Complaint   Patient presents with    Knee Pain     Pt complaining of left knee pain due to fall last night     Patient presents today with complaint of left knee pain.  She reports slipping and falling last night.  Has had pain in her left knee ever since.  Pain is described as aching and she rates it a 10 on a 10 scale when moving.  Does improve with rest.        Review of patient's allergies indicates:   Allergen Reactions    Aspirin Hives     Past Medical History:   Diagnosis Date    Anemia, unspecified      Past Surgical History:   Procedure Laterality Date    CYSTOSCOPY N/A 12/6/2023    Procedure: CYSTOSCOPY;  Surgeon: Jeremiah Valdivia MD;  Location: [facility] OR;  Service: OB/GYN;  Laterality: N/A;    EXTERNAL EAR SURGERY Right     HAND SURGERY Right     HYSTERECTOMY, TOTAL, LAPAROSCOPIC, WITH SALPINGO-OOPHORECTOMY Right 12/6/2023    Procedure: HYSTERECTOMY,TOTAL,LAPAROSCOPIC,WITH SALPINGO-OOPHORECTOMY;  Surgeon: Jeremiah Valdivia MD;  Location: [facility] OR;  Service: OB/GYN;  Laterality: Right;    TUBAL LIGATION       Family History   Problem Relation Name Age of Onset    Diabetes Mellitus Mother      Diabetes Mellitus Father      Hypertension Maternal Grandfather      Diabetes Paternal Grandmother      Hypertension Paternal Grandfather       Social History[1]  Review of Systems   Constitutional: Negative.    Respiratory: Negative.     Cardiovascular: Negative.    All other systems reviewed and are negative.      Physical Exam     Initial Vitals [03/12/25 1232]   BP Pulse Resp Temp SpO2   (!) 153/98 89 18 98.1 °F (36.7 °C) 98 %      MAP       --         Physical Exam    Nursing note and vitals reviewed.  Constitutional: She appears well-developed and well-nourished.   Cardiovascular:  Normal rate, regular rhythm and normal heart sounds.           No murmur heard.  Pulmonary/Chest: Breath sounds normal. No respiratory distress.   Musculoskeletal:          General: Tenderness: Anterior left knee.     Neurological: She is alert and oriented to person, place, and time. No cranial nerve deficit.   Skin: Skin is warm and dry.   Psychiatric: She has a normal mood and affect.         Medical Screening Exam   See Full Note    ED Course   Procedures  Labs Reviewed - No data to display       Imaging Results              X-Ray Knee 1 or 2 View Left (In process)                      Medications   acetaminophen tablet 1,000 mg (1,000 mg Oral Given 3/12/25 1249)     Medical Decision Making  Amount and/or Complexity of Data Reviewed  Radiology: ordered.    Risk  OTC drugs.               ED Course as of 03/12/25 1258   Wed Mar 12, 2025   1254 Two-view left knee:  No acute fracture or dislocation [BC]   1256 X-ray show no evidence of acute fracture. [BC]   1256 On exam patient does have significant tenderness in the anterior knee also has increased pain with anterior drawer.  Knee does appear to be stable however.  We will have follow up with her primary care provider or orthopedic of her choice next available appointment [BC]      ED Course User Index  [BC] Christiano Simeon NP                           Clinical Impression:   Final diagnoses:  [T14.90XA] Trauma  [M25.562] Acute pain of left knee (Primary)        ED Disposition Condition    Discharge Stable          ED Prescriptions    None       Follow-up Information    None            [1]   Social History  Tobacco Use    Smoking status: Former     Types: Cigars     Passive exposure: Current    Smokeless tobacco: Never   Substance Use Topics    Alcohol use: Not Currently     Alcohol/week: 2.0 standard drinks of alcohol     Types: 2 Glasses of wine per week     Comment: occ    Drug use: Not Currently        Christiano Simeon NP  03/12/25 1258

## 2025-03-12 NOTE — Clinical Note
"Daja Cookchrsi" Dominic was seen and treated in our emergency department on 3/12/2025.  She may return to work on 03/14/2025.       If you have any questions or concerns, please don't hesitate to call.      Hoa Skinner RN    "

## 2025-03-12 NOTE — DISCHARGE INSTRUCTIONS
Follow-up with your primary care provider or orthopedic of your choice next available appointment.  Return to emergency department for any worsening condition or any concerns.  Rest ice and elevate frequently for the next 48 hours.  May use Tylenol as needed for pain

## 2025-03-13 ENCOUNTER — TELEPHONE (OUTPATIENT)
Dept: EMERGENCY MEDICINE | Facility: HOSPITAL | Age: 40
End: 2025-03-13
Payer: COMMERCIAL

## 2025-03-20 ENCOUNTER — OFFICE VISIT (OUTPATIENT)
Dept: FAMILY MEDICINE | Facility: CLINIC | Age: 40
End: 2025-03-20
Payer: COMMERCIAL

## 2025-03-20 VITALS
SYSTOLIC BLOOD PRESSURE: 124 MMHG | HEIGHT: 68 IN | BODY MASS INDEX: 44.41 KG/M2 | OXYGEN SATURATION: 97 % | WEIGHT: 293 LBS | TEMPERATURE: 99 F | HEART RATE: 84 BPM | DIASTOLIC BLOOD PRESSURE: 82 MMHG | RESPIRATION RATE: 16 BRPM

## 2025-03-20 DIAGNOSIS — M25.562 LEFT KNEE PAIN, UNSPECIFIED CHRONICITY: Primary | ICD-10-CM

## 2025-03-20 PROCEDURE — 3074F SYST BP LT 130 MM HG: CPT | Mod: CPTII,,, | Performed by: NURSE PRACTITIONER

## 2025-03-20 PROCEDURE — 1159F MED LIST DOCD IN RCRD: CPT | Mod: CPTII,,, | Performed by: NURSE PRACTITIONER

## 2025-03-20 PROCEDURE — 1160F RVW MEDS BY RX/DR IN RCRD: CPT | Mod: CPTII,,, | Performed by: NURSE PRACTITIONER

## 2025-03-20 PROCEDURE — 3079F DIAST BP 80-89 MM HG: CPT | Mod: CPTII,,, | Performed by: NURSE PRACTITIONER

## 2025-03-20 PROCEDURE — 99213 OFFICE O/P EST LOW 20 MIN: CPT | Mod: ,,, | Performed by: NURSE PRACTITIONER

## 2025-03-20 PROCEDURE — 3008F BODY MASS INDEX DOCD: CPT | Mod: CPTII,,, | Performed by: NURSE PRACTITIONER

## 2025-03-20 RX ORDER — DICLOFENAC SODIUM 10 MG/G
2 GEL TOPICAL 4 TIMES DAILY
Qty: 350 G | Refills: 0 | Status: SHIPPED | OUTPATIENT
Start: 2025-03-20

## 2025-03-20 NOTE — LETTER
March 20, 2025      [facility]  [address] [place] [address]  Phone: [phone]  Fax: [phone]       Patient: Daja Alfaro   YOB: 1985  Date of Visit: 03/20/2025    To Whom It May Concern:    Mckenzie Alfaro  was at [facility] on 03/20/2025. The patient may return to work/school on 03/21/2025 with no restrictions. If you have any questions or concerns, or if I can be of further assistance, please do not hesitate to contact me.    Sincerely,    STEWART Sutton

## 2025-03-20 NOTE — PROGRESS NOTES
"Clinic Note    Daja Alfaro is a 40 y.o. female     Chief Complaint:   Chief Complaint   Patient presents with    Follow-up     ER follow up; fall     Fall     Pt reports fall on 03/11/2025. Pt reports she went to the ER and she may have a possible torn ACL on the left.     Insomnia     Pt reports she is unable to sleep due to pain         Subjective:    Patient complains of left knee pain. Patient reports fall in carport after slipping on wet surface. States did a split and then fell to right side. Went to ED next day after fall on 3-13-25. Xray negative. Applied ice after accident prn. Tries to avoid nsaid due to kidney issues if possible. Pain with weight bearing and movement.          Allergies:   Review of patient's allergies indicates:   Allergen Reactions    Aspirin Hives        Past Medical History:  Past Medical History:   Diagnosis Date    Anemia, unspecified         Current Medications:  Current Medications[1]       Review of Systems   Constitutional:  Negative for fever.   Respiratory:  Negative for cough and shortness of breath.    Cardiovascular:  Negative for chest pain, palpitations and leg swelling.   Gastrointestinal:  Negative for abdominal pain, constipation, diarrhea, nausea and vomiting.   Genitourinary:  Negative for dysuria.   Musculoskeletal:  Positive for arthralgias and joint swelling.   Neurological:  Negative for headaches.          Objective:    /82 (BP Location: Left arm, Patient Position: Sitting)   Pulse 84   Temp 98.5 °F (36.9 °C) (Oral)   Resp 16   Ht 5' 8" (1.727 m)   Wt (!) 149.7 kg (330 lb)   LMP 11/22/2023 (Approximate)   SpO2 97%   BMI 50.18 kg/m²      Physical Exam  Constitutional:       Appearance: Normal appearance. She is obese.   Eyes:      Extraocular Movements: Extraocular movements intact.   Cardiovascular:      Rate and Rhythm: Normal rate and regular rhythm.      Pulses: Normal pulses.      Heart sounds: Normal heart sounds.   Pulmonary:      " Effort: Pulmonary effort is normal.      Breath sounds: Normal breath sounds.   Musculoskeletal:      Left knee: Swelling present. No bony tenderness. Decreased range of motion. Tenderness present over the patellar tendon.        Legs:       Comments: Mild inflammation to left knee. Tenderness noted to anterior knee, especially patellar tendon   Neurological:      Mental Status: She is alert and oriented to person, place, and time.          Assessment and Plan:    1. Left knee pain, unspecified chronicity         Left knee pain, unspecified chronicity  -     Ambulatory referral/consult to Orthopedics; Future; Expected date: 03/27/2025  -     diclofenac sodium (VOLTAREN ARTHRITIS PAIN) 1 % Gel; Apply 2 g topically 4 (four) times daily.  Dispense: 350 g; Refill: 0    -ice to knee prn  -RICE therapy  -patient states she must return to work tomorrow due to new job and need to pay bills. Instructed not recommend but no strenuous activity.       There are no Patient Instructions on file for this visit.   Follow up if symptoms worsen or fail to improve.          [1]   Current Outpatient Medications:     acetaminophen (TYLENOL) 500 MG tablet, Take 500 mg by mouth every 6 (six) hours as needed., Disp: , Rfl:     FEROSUL 325 mg (65 mg iron) Tab tablet, Take by mouth once daily., Disp: , Rfl:     mv-min/iron/folic/calcium/vitK (WOMEN'S MULTIVITAMIN ORAL), Take by mouth., Disp: , Rfl:     paroxetine (PAXIL) 10 MG tablet, Take 1 tablet (10 mg total) by mouth every morning., Disp: 90 tablet, Rfl: 1    diclofenac sodium (VOLTAREN ARTHRITIS PAIN) 1 % Gel, Apply 2 g topically 4 (four) times daily., Disp: 350 g, Rfl: 0

## 2025-03-27 ENCOUNTER — OFFICE VISIT (OUTPATIENT)
Dept: FAMILY MEDICINE | Facility: CLINIC | Age: 40
End: 2025-03-27
Payer: COMMERCIAL

## 2025-03-27 VITALS
RESPIRATION RATE: 18 BRPM | HEART RATE: 77 BPM | HEIGHT: 68 IN | OXYGEN SATURATION: 99 % | BODY MASS INDEX: 44.41 KG/M2 | WEIGHT: 293 LBS | SYSTOLIC BLOOD PRESSURE: 106 MMHG | DIASTOLIC BLOOD PRESSURE: 75 MMHG | TEMPERATURE: 99 F

## 2025-03-27 DIAGNOSIS — M25.562 LEFT KNEE PAIN, UNSPECIFIED CHRONICITY: Primary | ICD-10-CM

## 2025-03-27 PROCEDURE — 96372 THER/PROPH/DIAG INJ SC/IM: CPT | Mod: ,,, | Performed by: NURSE PRACTITIONER

## 2025-03-27 PROCEDURE — 3008F BODY MASS INDEX DOCD: CPT | Mod: CPTII,,, | Performed by: NURSE PRACTITIONER

## 2025-03-27 PROCEDURE — 99213 OFFICE O/P EST LOW 20 MIN: CPT | Mod: 25,,, | Performed by: NURSE PRACTITIONER

## 2025-03-27 PROCEDURE — 1159F MED LIST DOCD IN RCRD: CPT | Mod: CPTII,,, | Performed by: NURSE PRACTITIONER

## 2025-03-27 PROCEDURE — 3078F DIAST BP <80 MM HG: CPT | Mod: CPTII,,, | Performed by: NURSE PRACTITIONER

## 2025-03-27 PROCEDURE — 3074F SYST BP LT 130 MM HG: CPT | Mod: CPTII,,, | Performed by: NURSE PRACTITIONER

## 2025-03-27 PROCEDURE — 1160F RVW MEDS BY RX/DR IN RCRD: CPT | Mod: CPTII,,, | Performed by: NURSE PRACTITIONER

## 2025-03-27 RX ORDER — KETOROLAC TROMETHAMINE 30 MG/ML
30 INJECTION, SOLUTION INTRAMUSCULAR; INTRAVENOUS
Status: COMPLETED | OUTPATIENT
Start: 2025-03-27 | End: 2025-03-27

## 2025-03-27 RX ADMIN — KETOROLAC TROMETHAMINE 30 MG: 30 INJECTION, SOLUTION INTRAMUSCULAR; INTRAVENOUS at 02:03

## 2025-03-27 NOTE — PROGRESS NOTES
"Clinic Note    Daja Alfaro is a 40 y.o. female     Chief Complaint:   Chief Complaint   Patient presents with    Leg Pain     Pt reports chronic left leg pain         Subjective:    Patient complains of leg pain. Admits to pain worse to left knee due to recent injury. States still has some swelling to knee. Reports aching to bilateral lower extremities in joints. Tries to avoid nsaids due to kidneys. Patient job requires bending and a lot of movement ().    Leg Pain          Allergies:   Review of patient's allergies indicates:   Allergen Reactions    Aspirin Hives        Past Medical History:  Past Medical History:   Diagnosis Date    Anemia, unspecified         Current Medications:  Current Medications[1]       Review of Systems   Constitutional:  Negative for fever.   Respiratory:  Negative for cough and shortness of breath.    Cardiovascular:  Negative for chest pain, palpitations and leg swelling.   Gastrointestinal:  Negative for abdominal pain, constipation, diarrhea, nausea and vomiting.   Genitourinary:  Negative for dysuria.   Musculoskeletal:  Positive for arthralgias, joint swelling and leg pain.   Neurological:  Negative for headaches.          Objective:    /75 (BP Location: Left arm, Patient Position: Sitting)   Pulse 77   Temp 98.5 °F (36.9 °C) (Oral)   Resp 18   Ht 5' 8" (1.727 m)   Wt (!) 149.7 kg (330 lb)   LMP 11/22/2023 (Approximate)   SpO2 99%   BMI 50.18 kg/m²      Physical Exam  Constitutional:       Appearance: Normal appearance. She is obese.   Eyes:      Extraocular Movements: Extraocular movements intact.   Cardiovascular:      Rate and Rhythm: Normal rate and regular rhythm.      Pulses: Normal pulses.      Heart sounds: Normal heart sounds.   Pulmonary:      Effort: Pulmonary effort is normal.      Breath sounds: Normal breath sounds.   Musculoskeletal:      Right knee: No swelling. Tenderness present.      Left knee: Swelling present. Decreased range of " motion. Tenderness present.   Neurological:      Mental Status: She is alert and oriented to person, place, and time.      Gait: Gait abnormal.          Assessment and Plan:    1. Left knee pain, unspecified chronicity         Left knee pain, unspecified chronicity  -     ketorolac injection 30 mg    -continue voltaren and tylenol prn  -f/u with ortho on Monday  -ice prn        There are no Patient Instructions on file for this visit.   Follow up if symptoms worsen or fail to improve.          [1]   Current Outpatient Medications:     acetaminophen (TYLENOL) 500 MG tablet, Take 500 mg by mouth every 6 (six) hours as needed., Disp: , Rfl:     diclofenac sodium (VOLTAREN ARTHRITIS PAIN) 1 % Gel, Apply 2 g topically 4 (four) times daily., Disp: 350 g, Rfl: 0    FEROSUL 325 mg (65 mg iron) Tab tablet, Take by mouth once daily., Disp: , Rfl:     mv-min/iron/folic/calcium/vitK (WOMEN'S MULTIVITAMIN ORAL), Take by mouth., Disp: , Rfl:     paroxetine (PAXIL) 10 MG tablet, Take 1 tablet (10 mg total) by mouth every morning., Disp: 90 tablet, Rfl: 1  No current facility-administered medications for this visit.

## 2025-03-27 NOTE — LETTER
March 27, 2025      [facility]  [address] [place] [address]  Phone: [phone]  Fax: [phone]       Patient: Daja Alfaro   YOB: 1985  Date of Visit: 03/27/2025    To Whom It May Concern:    Mckenzie Alfaro  was at [facility] on 03/27/2025. The patient may return to work/school on Wednesday 04/02/2025 with no restrictions. If you have any questions or concerns, or if I can be of further assistance, please do not hesitate to contact me.    Sincerely,    Janell Montero LPN

## 2025-03-31 ENCOUNTER — OFFICE VISIT (OUTPATIENT)
Dept: ORTHOPEDICS | Facility: CLINIC | Age: 40
End: 2025-03-31
Payer: COMMERCIAL

## 2025-03-31 DIAGNOSIS — M25.561 PAIN IN BOTH KNEES, UNSPECIFIED CHRONICITY: Primary | ICD-10-CM

## 2025-03-31 DIAGNOSIS — M25.562 PAIN IN BOTH KNEES, UNSPECIFIED CHRONICITY: Primary | ICD-10-CM

## 2025-03-31 DIAGNOSIS — M25.562 LEFT KNEE PAIN, UNSPECIFIED CHRONICITY: ICD-10-CM

## 2025-03-31 PROCEDURE — 1159F MED LIST DOCD IN RCRD: CPT | Mod: CPTII,,, | Performed by: NURSE PRACTITIONER

## 2025-03-31 PROCEDURE — 99213 OFFICE O/P EST LOW 20 MIN: CPT | Mod: PBBFAC | Performed by: NURSE PRACTITIONER

## 2025-03-31 PROCEDURE — 99204 OFFICE O/P NEW MOD 45 MIN: CPT | Mod: S$PBB,,, | Performed by: NURSE PRACTITIONER

## 2025-03-31 PROCEDURE — 99999 PR PBB SHADOW E&M-EST. PATIENT-LVL III: CPT | Mod: PBBFAC,,, | Performed by: NURSE PRACTITIONER

## 2025-03-31 NOTE — PROGRESS NOTES
ASSESSMENT:      ICD-10-CM ICD-9-CM   1. Pain in both knees, unspecified chronicity  M25.561 719.46    M25.562    2. Left knee pain, unspecified chronicity  M25.562 719.46       PLAN:     Findings and treatment options were discussed with the patient regarding the diagnosis.   All questions were answered regarding Daja Alfaro 's painful knee.      Natural history and expected course discussed. Questions answered. Educational materials distributed. Reduction in offending activity. OTC analgesics as needed. MRI.  Rheumatoid labs.  MRI left knee.  We will call with results.  Unable to take anti-inflammatories  There are no Patient Instructions on file for this visit.    IMAGING:   X-Ray Knee 1 or 2 View Left  Result Date: 3/12/2025  EXAMINATION: XR KNEE 1 OR 2 VIEW LEFT CLINICAL HISTORY: Injury, unspecified, initial encounter TECHNIQUE: One or two views of the left knee were performed. COMPARISON: None FINDINGS: Two views left knee. No acute displaced fracture or dislocation of the knee.  No radiopaque foreign body.  No significant knee joint effusion.     1. No acute displaced fracture or dislocation of the knee. Electronically signed by: Angel Land MD Date:    03/12/2025 Time:    13:09         CC: Knee pain    40 y.o. Female who presents as a new patient to me for evaluation of bilateral knee pain with the left being worse.   She is awaiting an appointment with rheumatology in Sept.   Occupation:   Pain has been present for a couple of weeks.  Injury description She fell under her carport causing her knee to twist, her left knee went back behind her.  Has been painful since that time.  Painful to bear weight.  Any twisting motion is painful.  Pain on the medial side of the knee and over her patellar tendon.  Mechanical symptoms, such as clicking, locking, and popping are present.    Swelling and effusions  are present.   The patient does  describe symptoms of knee instability, such as the knee  buckling and the knee giving way.   Symptoms are worsened with activity.  Better with rest. Treatment thus far has included rest, activity modifications, and oral medications.    she  has not had formal physical therapy.  she has not had prior injections injections into the knee.   she has not had previous advanced imaging such as MRI.     Here today to discuss diagnosis and treatment options.          REVIEW OF SYSTEMS:   Constitution: Negative. Negative for chills, fever and night sweats.    Hematologic/Lymphatic: Negative for bleeding problem. Does not bruise/bleed easily.   Skin: Negative for dry skin, itching and rash.   Musculoskeletal: Negative for falls. Positive for knee pain and muscle weakness.     All other review of symptoms were reviewed and found to be noncontributory.     PAST MEDICAL HISTORY:   Past Medical History:   Diagnosis Date    Anemia, unspecified        PAST SURGICAL HISTORY:   Past Surgical History:   Procedure Laterality Date    CYSTOSCOPY N/A 12/6/2023    Procedure: CYSTOSCOPY;  Surgeon: Jeremiah Valdivia MD;  Location: [facility];  Service: OB/GYN;  Laterality: N/A;    EXTERNAL EAR SURGERY Right     HAND SURGERY Right     HYSTERECTOMY, TOTAL, LAPAROSCOPIC, WITH SALPINGO-OOPHORECTOMY Right 12/6/2023    Procedure: HYSTERECTOMY,TOTAL,LAPAROSCOPIC,WITH SALPINGO-OOPHORECTOMY;  Surgeon: Jeremiah Valdivia MD;  Location: [facility] OR;  Service: OB/GYN;  Laterality: Right;    TUBAL LIGATION         FAMILY HISTORY:   Family History   Problem Relation Name Age of Onset    Diabetes Mellitus Mother      Diabetes Mellitus Father      Hypertension Maternal Grandfather      Diabetes Paternal Grandmother      Hypertension Paternal Grandfather         SOCIAL HISTORY:   Social History[1]    MEDICATIONS:   Current Medications[2]    ALLERGIES:   Review of patient's allergies indicates:   Allergen Reactions    Aspirin Hives        PHYSICAL EXAMINATION:                Left Knee Exam     Inspection    Swelling: present  Deformity: absent  Bruising: absent    Tenderness   The patient tender to palpation of the medial joint line, patella and patellar tendon.    Range of Motion   Extension:  normal   Flexion:  abnormal     Tests   Meniscus   Yaw:  Medial - positive     Other   Sensation: normal    Vascular Exam       Left Pulses  Dorsalis Pedis:      2+            Orders Placed This Encounter   Procedures    MRI Knee Without Contrast Left     Standing Status:   Future     Expected Date:   3/31/2025     Expiration Date:   3/31/2026     Does the patient have or ever had a pacemaker or a defibrillator (Note: Some facilities may not be able to schedule an MRI for patients with pacemakers and defibrillators. You should contact your local radiology dept to determine if this is the case.)?:   No     Does the patient have an aneurysm or surgical clip, pump, nerve/brain stimulator, middle/inner ear prosthesis, or other metal implant or foreign object (bullet, shrapnel)? If they have a card related to their implant, ask them to bring it. Issues related to the implant may cause the MRI to be delayed.:   No     Will the patient require po anxiolysis or sedation?:   No     May the Radiologist modify the order per protocol to meet the clinical needs of the patient?:   Yes     Does the patient have on a skin patch for medication with aluminized backing?:   No    Sedimentation Rate     Standing Status:   Future     Number of Occurrences:   1     Expected Date:   3/31/2025     Expiration Date:   5/30/2026    Rheumatoid Quantitative     Standing Status:   Future     Number of Occurrences:   1     Expected Date:   3/31/2025     Expiration Date:   6/29/2026    MALACHI EIA w/ Reflex to dsDNA/BENY     Standing Status:   Future     Number of Occurrences:   1     Expected Date:   3/31/2025     Expiration Date:   6/29/2026    Uric Acid     Standing Status:   Future     Number of Occurrences:   1     Expected Date:   3/31/2025      Expiration Date:   6/29/2026       Procedures           [1]   Social History  Socioeconomic History    Marital status: Significant Other   Tobacco Use    Smoking status: Former     Types: Cigars     Passive exposure: Current    Smokeless tobacco: Never   Substance and Sexual Activity    Alcohol use: Not Currently     Alcohol/week: 2.0 standard drinks of alcohol     Types: 2 Glasses of wine per week     Comment: occ    Drug use: Not Currently    Sexual activity: Yes     Partners: Male     Birth control/protection: See Surgical Hx   [2]   Current Outpatient Medications:     acetaminophen (TYLENOL) 500 MG tablet, Take 500 mg by mouth every 6 (six) hours as needed., Disp: , Rfl:     diclofenac sodium (VOLTAREN ARTHRITIS PAIN) 1 % Gel, Apply 2 g topically 4 (four) times daily., Disp: 350 g, Rfl: 0    FEROSUL 325 mg (65 mg iron) Tab tablet, Take by mouth once daily., Disp: , Rfl:     mv-min/iron/folic/calcium/vitK (WOMEN'S MULTIVITAMIN ORAL), Take by mouth., Disp: , Rfl:     paroxetine (PAXIL) 10 MG tablet, Take 1 tablet (10 mg total) by mouth every morning., Disp: 90 tablet, Rfl: 1

## 2025-04-02 ENCOUNTER — RESULTS FOLLOW-UP (OUTPATIENT)
Dept: ORTHOPEDICS | Facility: CLINIC | Age: 40
End: 2025-04-02

## 2025-04-03 ENCOUNTER — TELEPHONE (OUTPATIENT)
Dept: ORTHOPEDICS | Facility: CLINIC | Age: 40
End: 2025-04-03
Payer: COMMERCIAL

## 2025-04-03 NOTE — TELEPHONE ENCOUNTER
----- Message from STEWART Rivera sent at 4/2/2025 12:05 PM CDT -----  Please let patient know her labs are negative for rheumatoid  ----- Message -----  From: Lab, Background User  Sent: 3/31/2025  12:13 PM CDT  To: STEWART Hutchison

## 2025-04-29 ENCOUNTER — TELEPHONE (OUTPATIENT)
Dept: ORTHOPEDICS | Facility: CLINIC | Age: 40
End: 2025-04-29
Payer: MEDICAID

## 2025-04-29 NOTE — TELEPHONE ENCOUNTER
Tried to call patient back. I see that she did schedule an appointment with her PCP tomorrow. She has an MRI ordered and I am not sure why that is not scheduled. We will try to call tomorrow.    ----- Message from Tech Laturina sent at 4/29/2025  2:02 PM CDT -----  Who Called: Daja Wasserman is requesting a sooner appointment. Caller declined first available appointment listed below. Caller will not accept being placed on the waitlist and is requesting a message be sent to doctor.When is the first available appointment?Options offered (Virtual Visit, Urgent Care): Symptoms:Preferred Method of Contact: Phone CallPatient's Preferred Phone Number on File: [phone] Best Call Back Number, if different:Additional Information: patient would like to get rescheduled for her MRI. Patient would like a call back with the appointment date and time.

## 2025-04-30 ENCOUNTER — TELEPHONE (OUTPATIENT)
Dept: ORTHOPEDICS | Facility: CLINIC | Age: 40
End: 2025-04-30
Payer: MEDICAID

## 2025-04-30 ENCOUNTER — OFFICE VISIT (OUTPATIENT)
Dept: FAMILY MEDICINE | Facility: CLINIC | Age: 40
End: 2025-04-30
Payer: MEDICAID

## 2025-04-30 VITALS
RESPIRATION RATE: 20 BRPM | SYSTOLIC BLOOD PRESSURE: 114 MMHG | HEART RATE: 100 BPM | DIASTOLIC BLOOD PRESSURE: 72 MMHG | WEIGHT: 293 LBS | OXYGEN SATURATION: 98 % | TEMPERATURE: 98 F | BODY MASS INDEX: 44.41 KG/M2 | HEIGHT: 68 IN

## 2025-04-30 DIAGNOSIS — M25.562 LEFT KNEE PAIN, UNSPECIFIED CHRONICITY: Primary | ICD-10-CM

## 2025-04-30 DIAGNOSIS — M25.531 RIGHT WRIST PAIN: ICD-10-CM

## 2025-04-30 PROCEDURE — 1159F MED LIST DOCD IN RCRD: CPT | Mod: CPTII,,, | Performed by: NURSE PRACTITIONER

## 2025-04-30 PROCEDURE — 3074F SYST BP LT 130 MM HG: CPT | Mod: CPTII,,, | Performed by: NURSE PRACTITIONER

## 2025-04-30 PROCEDURE — 99213 OFFICE O/P EST LOW 20 MIN: CPT | Mod: ,,, | Performed by: NURSE PRACTITIONER

## 2025-04-30 PROCEDURE — 3008F BODY MASS INDEX DOCD: CPT | Mod: CPTII,,, | Performed by: NURSE PRACTITIONER

## 2025-04-30 PROCEDURE — 3078F DIAST BP <80 MM HG: CPT | Mod: CPTII,,, | Performed by: NURSE PRACTITIONER

## 2025-04-30 PROCEDURE — 1160F RVW MEDS BY RX/DR IN RCRD: CPT | Mod: CPTII,,, | Performed by: NURSE PRACTITIONER

## 2025-04-30 RX ORDER — NAPROXEN 500 MG/1
500 TABLET ORAL 2 TIMES DAILY PRN
Qty: 20 TABLET | Refills: 0 | Status: SHIPPED | OUTPATIENT
Start: 2025-04-30

## 2025-04-30 NOTE — TELEPHONE ENCOUNTER
----- Message from Mahad sent at 4/29/2025  4:37 PM CDT -----  Who Called: Daja Calderon is calling back pt states she had an MRI set up and had to cancel it bc her ins ended pt states she is going to see PCP to get something for pain pt would like to set the MRI back up asap Preferred Method of Contact: Phone CallPatient's Preferred Phone Number on File: [phone]

## 2025-04-30 NOTE — PROGRESS NOTES
"Clinic Note    Daja Alfaro is a 40 y.o. female     Chief Complaint:   Chief Complaint   Patient presents with    Knee Pain     Left knee pain    Wrist Injury     Right wrist pain        Subjective:    Patient complains of continued left knee pain. Admits to popping. Has seen ortho. Awaiting mri. Insurance recently changed and precerting through new insurance now. Admits to swelling. Tender to touch. Worse with movements. Has been using otc cream for arthritis which helps temporarily. Has been taking tylenol prn but denies significant relief.   Patient reports right wrist pain. Denies swelling. Worse with movements. Has hx of wrist sx for removal of hemangioma in 2009.  Patient reports she has a follow up with urology Dr. Chowdhury in June.     Knee Pain     Wrist Injury   Pertinent negatives include no chest pain.        Allergies:   Review of patient's allergies indicates:   Allergen Reactions    Aspirin Hives        Past Medical History:  Past Medical History:   Diagnosis Date    Anemia, unspecified         Current Medications:  Current Medications[1]       Review of Systems   Constitutional:  Negative for fever.   Respiratory:  Negative for cough and shortness of breath.    Cardiovascular:  Negative for chest pain, palpitations and leg swelling.   Gastrointestinal:  Negative for abdominal pain, constipation, diarrhea, nausea and vomiting.   Genitourinary:  Negative for dysuria.   Musculoskeletal:  Positive for arthralgias, gait problem and joint swelling.   Neurological:  Negative for headaches.          Objective:    /72 (BP Location: Left arm, Patient Position: Sitting)   Pulse 100   Temp 98.3 °F (36.8 °C) (Oral)   Resp 20   Ht 5' 8" (1.727 m)   Wt (!) 149.6 kg (329 lb 12.8 oz)   LMP 11/22/2023 (Approximate)   SpO2 98%   BMI 50.15 kg/m²      Physical Exam  Constitutional:       Appearance: Normal appearance. She is obese.   Eyes:      Extraocular Movements: Extraocular movements intact. "   Cardiovascular:      Rate and Rhythm: Normal rate and regular rhythm.      Pulses: Normal pulses.      Heart sounds: Normal heart sounds.   Pulmonary:      Effort: Pulmonary effort is normal.      Breath sounds: Normal breath sounds.   Musculoskeletal:      Right wrist: Tenderness present. No swelling or bony tenderness. Decreased range of motion. Normal pulse.      Left knee: Crepitus present. Decreased range of motion. Tenderness present over the patellar tendon.   Neurological:      Mental Status: She is alert and oriented to person, place, and time.          Assessment and Plan:    1. Left knee pain, unspecified chronicity    2. Right wrist pain         Left knee pain, unspecified chronicity  -     naproxen (NAPROSYN) 500 MG tablet; Take 1 tablet (500 mg total) by mouth 2 (two) times daily as needed.  Dispense: 20 tablet; Refill: 0    Right wrist pain  -     naproxen (NAPROSYN) 500 MG tablet; Take 1 tablet (500 mg total) by mouth 2 (two) times daily as needed.  Dispense: 20 tablet; Refill: 0    -will try oral nsaid for short term use only. Urology recommend avoid nsaids when possible (no long term). Tylenol not controlling pain. Has tolerated nsaids other than asa without adverse reaction  -continue arthritis cream prn  -ice to joint prn  -f/u with ortho and mri      There are no Patient Instructions on file for this visit.   Follow up if symptoms worsen or fail to improve.          [1]   Current Outpatient Medications:     acetaminophen (TYLENOL) 500 MG tablet, Take 500 mg by mouth every 6 (six) hours as needed., Disp: , Rfl:     diclofenac sodium (VOLTAREN ARTHRITIS PAIN) 1 % Gel, Apply 2 g topically 4 (four) times daily., Disp: 350 g, Rfl: 0    FEROSUL 325 mg (65 mg iron) Tab tablet, Take by mouth once daily., Disp: , Rfl:     mv-min/iron/folic/calcium/vitK (WOMEN'S MULTIVITAMIN ORAL), Take by mouth., Disp: , Rfl:     naproxen (NAPROSYN) 500 MG tablet, Take 1 tablet (500 mg total) by mouth 2 (two) times  daily as needed., Disp: 20 tablet, Rfl: 0    paroxetine (PAXIL) 10 MG tablet, Take 1 tablet (10 mg total) by mouth every morning., Disp: 90 tablet, Rfl: 1

## 2025-05-08 ENCOUNTER — TELEPHONE (OUTPATIENT)
Dept: ORTHOPEDICS | Facility: CLINIC | Age: 40
End: 2025-05-08
Payer: MEDICAID

## 2025-05-08 ENCOUNTER — HOSPITAL ENCOUNTER (OUTPATIENT)
Dept: RADIOLOGY | Facility: HOSPITAL | Age: 40
Discharge: HOME | End: 2025-05-08
Attending: NURSE PRACTITIONER
Payer: MEDICAID

## 2025-05-08 DIAGNOSIS — M25.561 PAIN IN BOTH KNEES, UNSPECIFIED CHRONICITY: ICD-10-CM

## 2025-05-08 DIAGNOSIS — M25.562 PAIN IN BOTH KNEES, UNSPECIFIED CHRONICITY: ICD-10-CM

## 2025-05-08 DIAGNOSIS — M25.562 LEFT KNEE PAIN, UNSPECIFIED CHRONICITY: ICD-10-CM

## 2025-05-08 PROCEDURE — 73721 MRI JNT OF LWR EXTRE W/O DYE: CPT | Mod: TC,LT

## 2025-05-08 PROCEDURE — 73721 MRI JNT OF LWR EXTRE W/O DYE: CPT | Mod: 26,LT,, | Performed by: RADIOLOGY

## 2025-05-08 NOTE — TELEPHONE ENCOUNTER
----- Message from STEWART Rivera sent at 5/8/2025 10:01 AM CDT -----  She does have some cartilage wear under her patella but no tears in the knee.  Continue anti-inflammatories.  We can start her in some formal physical therapy if she is still having swelling and pain  ----- Message -----  From: Interface, Rad Results In  Sent: 5/8/2025   9:36 AM CDT  To: STEWART Hutchison

## 2025-05-12 ENCOUNTER — TELEPHONE (OUTPATIENT)
Dept: ORTHOPEDICS | Facility: CLINIC | Age: 40
End: 2025-05-12
Payer: MEDICAID

## 2025-05-12 DIAGNOSIS — M25.562 LEFT KNEE PAIN, UNSPECIFIED CHRONICITY: Primary | ICD-10-CM

## 2025-05-12 NOTE — TELEPHONE ENCOUNTER
Copied from CRM #[id number]. Topic: General Inquiry - Return Call  >> May 12, 2025 11:27 AM Jennifer wrote:  Who Called: Daja Alfaro    Patient is returning phone call    Who Left Message for Patient:Melany   Does the patient know what this is regarding?:MRI and Physical Therapy       Preferred Method of Contact: Phone Call  Patient's Preferred Phone Number on File: [phone]   Best Call Back Number, if different:[phone] updated   Additional Information:

## 2025-05-20 ENCOUNTER — HOSPITAL ENCOUNTER (OUTPATIENT)
Dept: RADIOLOGY | Facility: HOSPITAL | Age: 40
Discharge: HOME | End: 2025-05-20
Attending: NURSE PRACTITIONER
Payer: MEDICAID

## 2025-05-20 ENCOUNTER — OFFICE VISIT (OUTPATIENT)
Dept: FAMILY MEDICINE | Facility: CLINIC | Age: 40
End: 2025-05-20
Payer: MEDICAID

## 2025-05-20 VITALS
HEIGHT: 68 IN | DIASTOLIC BLOOD PRESSURE: 88 MMHG | BODY MASS INDEX: 44.41 KG/M2 | WEIGHT: 293 LBS | SYSTOLIC BLOOD PRESSURE: 123 MMHG | TEMPERATURE: 98 F | HEART RATE: 83 BPM | RESPIRATION RATE: 18 BRPM | OXYGEN SATURATION: 98 %

## 2025-05-20 DIAGNOSIS — M25.531 RIGHT WRIST PAIN: ICD-10-CM

## 2025-05-20 DIAGNOSIS — M25.531 RIGHT WRIST PAIN: Primary | ICD-10-CM

## 2025-05-20 PROCEDURE — 3008F BODY MASS INDEX DOCD: CPT | Mod: CPTII,,, | Performed by: NURSE PRACTITIONER

## 2025-05-20 PROCEDURE — 3074F SYST BP LT 130 MM HG: CPT | Mod: CPTII,,, | Performed by: NURSE PRACTITIONER

## 2025-05-20 PROCEDURE — 1160F RVW MEDS BY RX/DR IN RCRD: CPT | Mod: CPTII,,, | Performed by: NURSE PRACTITIONER

## 2025-05-20 PROCEDURE — 99213 OFFICE O/P EST LOW 20 MIN: CPT | Mod: ,,, | Performed by: NURSE PRACTITIONER

## 2025-05-20 PROCEDURE — 73110 X-RAY EXAM OF WRIST: CPT | Mod: TC,RT

## 2025-05-20 PROCEDURE — 3079F DIAST BP 80-89 MM HG: CPT | Mod: CPTII,,, | Performed by: NURSE PRACTITIONER

## 2025-05-20 PROCEDURE — 1159F MED LIST DOCD IN RCRD: CPT | Mod: CPTII,,, | Performed by: NURSE PRACTITIONER

## 2025-05-20 PROCEDURE — 73110 X-RAY EXAM OF WRIST: CPT | Mod: 26,RT,, | Performed by: RADIOLOGY

## 2025-05-20 RX ORDER — PAROXETINE 20 MG/1
20 TABLET, FILM COATED ORAL NIGHTLY
COMMUNITY
Start: 2025-05-06

## 2025-05-20 RX ORDER — HYDROXYZINE PAMOATE 25 MG/1
25 CAPSULE ORAL NIGHTLY PRN
COMMUNITY
Start: 2025-05-06

## 2025-05-20 NOTE — PROGRESS NOTES
"Clinic Note    Daja Alfaro is a 40 y.o. female     Chief Complaint:   Chief Complaint   Patient presents with    Wrist Pain     Patient presents on today states that her right wrist is hurting, when she writes and wash dishes and whenever she has to the her thumb. Pt states that no pain meds are helping.        Subjective:    Patient complains of right wrist pain. States sharp pain to wrist with thumb movement especially. Denies swelling. States nsaids did not help. States unable to bathe or wash dishes due to pain. Patient had hemangioma removed from right wrist in 2009.   Patient states she is scheduled to start physical therapy for knee pain. Patient is established with ortho         Allergies:   Review of patient's allergies indicates:   Allergen Reactions    Aspirin Hives        Past Medical History:  Past Medical History:   Diagnosis Date    Anemia, unspecified         Current Medications:  Current Medications[1]       Review of Systems   Constitutional:  Negative for fever.   Respiratory:  Negative for cough and shortness of breath.    Cardiovascular:  Negative for chest pain, palpitations and leg swelling.   Gastrointestinal:  Negative for abdominal pain, constipation, diarrhea, nausea and vomiting.   Genitourinary:  Negative for dysuria.   Musculoskeletal:  Positive for arthralgias and joint swelling.   Neurological:  Negative for headaches.          Objective:    /88 (BP Location: Left arm, Patient Position: Sitting)   Pulse 83   Temp 98 °F (36.7 °C) (Oral)   Resp 18   Ht 5' 8" (1.727 m)   Wt (!) 152 kg (335 lb)   LMP 11/22/2023 (Approximate)   SpO2 98%   BMI 50.94 kg/m²      Physical Exam  Constitutional:       Appearance: Normal appearance.   Eyes:      Extraocular Movements: Extraocular movements intact.   Cardiovascular:      Rate and Rhythm: Normal rate and regular rhythm.      Pulses: Normal pulses.      Heart sounds: Normal heart sounds.   Pulmonary:      Effort: Pulmonary effort " is normal.      Breath sounds: Normal breath sounds.   Musculoskeletal:      Right wrist: Tenderness present. No swelling or bony tenderness. Decreased range of motion. Normal pulse.        Arms:       Comments: Tenderness to right wrist. Decrease rom due to pain. No swelling, erythema, or warmth noted.    Neurological:      Mental Status: She is alert and oriented to person, place, and time.          Assessment and Plan:    1. Right wrist pain         Right wrist pain  -     X-Ray Wrist Complete 3 views Right; Future; Expected date: 05/20/2025  -     Ambulatory Referral/Consult to Occupational Therapy    -ice to wrist prn  -continue voltaren gel prn  -no heavy lifting      There are no Patient Instructions on file for this visit.   Follow up if symptoms worsen or fail to improve.          [1]   Current Outpatient Medications:     hydrOXYzine pamoate (VISTARIL) 25 MG Cap, Take 25 mg by mouth nightly as needed., Disp: , Rfl:     paroxetine (PAXIL) 20 MG tablet, Take 20 mg by mouth every evening., Disp: , Rfl:     acetaminophen (TYLENOL) 500 MG tablet, Take 500 mg by mouth every 6 (six) hours as needed., Disp: , Rfl:     diclofenac sodium (VOLTAREN ARTHRITIS PAIN) 1 % Gel, Apply 2 g topically 4 (four) times daily., Disp: 350 g, Rfl: 0    FEROSUL 325 mg (65 mg iron) Tab tablet, Take by mouth once daily., Disp: , Rfl:     mv-min/iron/folic/calcium/vitK (WOMEN'S MULTIVITAMIN ORAL), Take by mouth., Disp: , Rfl:     naproxen (NAPROSYN) 500 MG tablet, Take 1 tablet (500 mg total) by mouth 2 (two) times daily as needed., Disp: 20 tablet, Rfl: 0

## 2025-05-21 ENCOUNTER — RESULTS FOLLOW-UP (OUTPATIENT)
Dept: FAMILY MEDICINE | Facility: CLINIC | Age: 40
End: 2025-05-21

## 2025-05-22 ENCOUNTER — CLINICAL SUPPORT (OUTPATIENT)
Dept: REHABILITATION | Facility: HOSPITAL | Age: 40
End: 2025-05-22
Payer: MEDICAID

## 2025-05-22 DIAGNOSIS — M25.562 LEFT KNEE PAIN, UNSPECIFIED CHRONICITY: Primary | ICD-10-CM

## 2025-05-22 PROCEDURE — 97161 PT EVAL LOW COMPLEX 20 MIN: CPT

## 2025-05-22 NOTE — PROGRESS NOTES
Outpatient Rehab    Physical Therapy Evaluation (only)    Patient Name: Daja Alfaro  MRN: [id number]  YOB: 1985  Encounter Date: 5/22/2025    Therapy Diagnosis:   Encounter Diagnosis   Name Primary?    Left knee pain, unspecified chronicity Yes     Physician: Melody Arreola FNP    Physician Orders: Eval and Treat  Medical Diagnosis: Left knee pain, unspecified chronicity    Visit # / Visits Authorized:  1 / 1  Insurance Authorization Period: 5/12/2025 to 5/12/2026  Date of Evaluation: 5/22/2025  Plan of Care Certification: 5/22/2025 to 6/27/25     Time In: 0808   Time Out: 0830  Total Time (in minutes): 22   Total Billable Time (in minutes):  22    Intake Outcome Measure for FOTO Survey    Therapist reviewed FOTO scores for Daja Alfaro on 5/22/2025.   FOTO report - see Media section or FOTO account episode details.     Intake Score: 18%    Precautions: none       Subjective   History of Present Illness  Daja is a 40 y.o. female who reports to physical therapy with a chief concern of Left knee.         Diagnostic tests related to this condition: MRI studies and X-ray.   MRI Studies Details: Intact menisci and cruciate ligaments.     Patellofemoral osteoarthritis manifest by cartilaginous fissuring at the level of the median patellar ridge with underlying subchondral edema.  X-Ray Details: No acute displaced fracture or dislocation of the knee.    History of Present Condition/Illness: Patient fell on her left knee about 5 weeks ago. She also injured her wrist as well. She reports her knee pain is getting worse. She reports walking increases pain.     Activities of Daily Living  Social history was obtained from Patient.               Previously independent with activities of daily living? Yes     Currently independent with activities of daily living? No  Activities currently needing assistance include Bathing and Dressing - lower body.        Previously independent with instrumental  activities of daily living? Yes     Currently independent with instrumental activities of daily living? No  Activities currently needing assistance include: Grocery/shopping.            Pain     Patient reports a current pain level of 9/10. Pain at best is reported as 7/10. Pain at worst is reported as 9/10.   Location: Left knee.  Clinical Progression (since onset): Worsening  Pain Qualities: Sharp, Aching, Burning  Pain-Relieving Factors: Rest, Medications - prescription, Ice, Activity modification  Pain-Aggravating Factors: Walking, Standing, Bending, Movement, Sitting, Squatting         Employment      .      Past Medical History/Physical Systems Review:   Daja Alfaro  has a past medical history of Anemia, unspecified.    Daja Alfaro  has a past surgical history that includes Hand surgery (Right); External ear surgery (Right); Tubal ligation; hysterectomy, total, laparoscopic, with salpingo-oophorectomy (Right, 12/6/2023); and Cystoscopy (N/A, 12/6/2023).    Daja has a current medication list which includes the following prescription(s): acetaminophen, diclofenac sodium, ferosul, hydroxyzine pamoate, mv-min/iron/folic/calcium/vitk, naproxen, and paroxetine.    Review of patient's allergies indicates:   Allergen Reactions    Aspirin Hives        Objective   Knee Observations  Left Knee Observations  Present: Edema  Not Present: Straight Leg Raise Extensor Lag     Edema pocket on lateral aspect of tibia.      Left Lower Extremity Reflexes  Patellar, L4: Normal (2+)     Hamstring, L5: Normal (2+)    Achilles, S1: Normal (2+)             Knee Palpation          Left Knee Palpation  Abnormal: Muscle and Tendon/Ligament  Unremarkable: Bony Prominence/Bursa  Left Knee Muscle Palpation Observations: tender quadriceps  Left Knee Tendon/Ligament Palpation Observations: tender distal patella tendon; anterior, medial and lateral joint line.            Knee Range of Motion   Left Knee   Active (deg)  Passive (deg) Pain   Flexion 80 90 Yes   Extension 0 0 Yes       Patient reports severe pain with extension and flexion              Knee Strength   Right Strength Right Pain Left Strength Left  Pain   Flexion (S2)     3-     Prone Flexion     3-     Extension (L3)     3-       Knee Extensor Lag  No Lag: Left           Hip Special Tests          Knee Special Tests  Other Knee Tests: Positive patellar pinch test; positive patellar grind    Unable to perform orthopedic tests due to patient reporting 9/10 pain when attempting tests.        Knee Patellar Screening  Left Patellar Static Positioning: Normal    Left Patellar Mobility  Normal: Lateral               Gait Analysis  Base of Support: Wide  Gait Pattern: Antalgic  Walking Speed: Decreased    Right Side Walking Observations  Decreased: Swing Time and Step Length  Right Foot Contact Pattern: Heel to toe    Left Side Walking Observations  Decreased: Stance Time and Step Length  Left Foot Contact Pattern: Heel to toe  Trunk Observations During Gait: Left lateral lean over stance limb           Time Entry(in minutes):  PT Evaluation (Low) Time Entry: 22    Assessment & Plan   Assessment  Daja presents with a condition of Low complexity.   Presentation of Symptoms: Evolving  Will Comorbidities Impact Care: Yes  Obesity    Functional Limitations: Activity tolerance, Ambulating on uneven surfaces, Carrying objects, Completing self-care activities, Completing work/school activities, Gait limitations, Range of motion, Performing household chores, Participating in leisure activities, Painful locomotion/ambulation, Pain with ADLs/IADLs, Standing tolerance  Impairments: Abnormal gait, Activity intolerance, Abnormal or restricted range of motion, Impaired physical strength, Lack of appropriate home exercise program, Pain with functional activity, Weight-bearing intolerance  Personal Factors Affecting Prognosis: Pain    Patient Goal for Therapy (PT): Return to PLOF and  activity.  Prognosis: Guarded  Prognosis Details: Patient limited by severe left knee pain. May not be able to tolerate therapy exercises.  Assessment Details: Patient will benefit from skilled therapy to address the following problems: pain, decreased ROM, decreased muscle strength, gait deficits, decreased activity tolerance.     Plan  From a physical therapy perspective, the patient would benefit from: Skilled Rehab Services    Planned therapy interventions include: Therapeutic exercise, Neuromuscular re-education, Manual therapy, and Gait training.    Planned modalities to include: Cryotherapy (cold pack), Electrical stimulation - passive/unattended, Thermotherapy (hot pack), and Ultrasound.        Visit Frequency: 2 times Per Week for 5 Weeks.       This plan was discussed with Patient and Therapy assistant.   Discussion participants: Agreed Upon Plan of Care  Plan details: Knee rehab program.          The patient's spiritual, cultural, and educational needs were considered, and the patient is agreeable to the plan of care and goals.           Goals:   Active       LTG       Pt will increase left knee flexion to 120 degrees to improve functional gait mechanics for community mobility.       Start:  05/22/25    Expected End:  06/27/25            Pt will increase right knee strength to 4/5 to allow patient to safely return to prior level of function       Start:  05/22/25    Expected End:  06/27/25            Patient will report decreased left knee pain to 2/10 or less with all functional activities to return to PLOF.       Start:  05/22/25    Expected End:  06/27/25            Patient demonstrates improved overall function per FOTO Knee Survey Discharge Score to 40 or greater.       Start:  05/22/25    Expected End:  06/27/25               STG       Patient will correctly demonstrate independent performance of Home Exercise Program.       Start:  05/22/25    Expected End:  06/13/25            Pt will increase  left knee active range of motion flexion to 100 degrees to improve functional joint mobility.       Start:  05/22/25    Expected End:  06/13/25            Pt will tolerate 25 minutes of therapeutic exercise with left knee pain no greater than 5/10.       Start:  05/22/25    Expected End:  06/13/25            Patient will successfully perform SLR to 45 degrees with no extensor lag.       Start:  05/22/25    Expected End:  06/13/25                Lemuel Sue, PT

## 2025-06-03 ENCOUNTER — CLINICAL SUPPORT (OUTPATIENT)
Dept: REHABILITATION | Facility: HOSPITAL | Age: 40
End: 2025-06-03
Payer: MEDICAID

## 2025-06-03 DIAGNOSIS — M25.562 LEFT KNEE PAIN, UNSPECIFIED CHRONICITY: Primary | ICD-10-CM

## 2025-06-03 DIAGNOSIS — M25.531 RIGHT WRIST PAIN: Primary | ICD-10-CM

## 2025-06-03 PROCEDURE — 97166 OT EVAL MOD COMPLEX 45 MIN: CPT

## 2025-06-03 PROCEDURE — 97110 THERAPEUTIC EXERCISES: CPT

## 2025-06-09 ENCOUNTER — TELEPHONE (OUTPATIENT)
Dept: ORTHOPEDICS | Facility: CLINIC | Age: 40
End: 2025-06-09
Payer: MEDICAID

## 2025-06-09 NOTE — TELEPHONE ENCOUNTER
Copied from CRM #[id number]. Topic: General Inquiry - Patient Advice  >> Jun 9, 2025 10:49 AM Adriana wrote:  Who Called: Daja Alfaro    Who Left Message for Patient: nursing staff   Does the patient know what this is regarding?: schedule MRI       Preferred Method of Contact: Phone Call  Patient's Preferred Phone Number on File: [phone]     Additional Information: pt is calling back to reschedule appt for MRI. Please contact pt

## 2025-06-10 ENCOUNTER — CLINICAL SUPPORT (OUTPATIENT)
Dept: REHABILITATION | Facility: HOSPITAL | Age: 40
End: 2025-06-10
Payer: MEDICAID

## 2025-06-10 DIAGNOSIS — M25.562 LEFT KNEE PAIN, UNSPECIFIED CHRONICITY: Primary | ICD-10-CM

## 2025-06-10 PROCEDURE — 97110 THERAPEUTIC EXERCISES: CPT | Mod: CQ

## 2025-06-10 NOTE — PROGRESS NOTES
Outpatient Rehab    Physical Therapy Visit    Patient Name: Daja Alfaro  MRN: [id number]  YOB: 1985  Encounter Date: 6/10/2025    Therapy Diagnosis:   No diagnosis found.    Physician: Melody Arreola FNP    Physician Orders: Eval and Treat  Medical Diagnosis: Left knee pain, unspecified chronicity    Visit # / Visits Authorized:  2 / 10  Insurance Authorization Period: 6/3/2025 to 6/27/2025  Date of Evaluation: 5/22/2025  Plan of Care Certification: 5/22/2025 to 6/27/2025      PT/PTA: PTA   Number of PTA visits since last PT visit:1  Time In: 0200   Time Out: 0238  Total Time (in minutes): 38   Total Billable Time (in minutes):  38 Minutes     FOTO:  Intake Score: 18%  Survey Score 2:  %  Survey Score 3:  %    Precautions: none       Subjective   Patient continues to report high pain levels with left knee..  Pain reported as 7/10. Pain increased when standing and walking.    Objective            Treatment:  Therapeutic Exercise  TE 2: left quad sets and SAQ big bolster 2 x 10  TE 3: seated left heel slides 2 x 10  TE 4: Standing left SLR, abduction and extension 1 x 10  TE 5: Standing hamsring curls 1 x 10  TE 6: Supine Straight Leg Rasies x 10      Time Entry(in minutes):  Therapeutic Exercise Time Entry: 38    Assessment & Plan   Assessment: Patient tolerated treated fairly well with moderate amount pain this session.  Evaluation/Treatment Tolerance: Patient limited by pain    The patient will continue to benefit from skilled outpatient physical therapy in order to address the deficits listed in the problem list on the initial evaluation, provide patient and family education, and maximize the patients level of independence in the home and community environments.     The patient's spiritual, cultural, and educational needs were considered, and the patient is agreeable to the plan of care and goals.     Education  Education was done with Patient. The patient's learning style includes  Demonstration, Listening, and Pictures/video. The patient Demonstrates understanding and Verbalizes understanding.                 Plan: Continue with therapy 2 x per week. Continue HEP.    Goals:   Active       LTG       Pt will increase left knee flexion to 120 degrees to improve functional gait mechanics for community mobility. (Progressing)       Start:  05/22/25    Expected End:  06/27/25            Pt will increase right knee strength to 4/5 to allow patient to safely return to prior level of function (Progressing)       Start:  05/22/25    Expected End:  06/27/25            Patient will report decreased left knee pain to 2/10 or less with all functional activities to return to PLOF. (Progressing)       Start:  05/22/25    Expected End:  06/27/25            Patient demonstrates improved overall function per FOTO Knee Survey Discharge Score to 40 or greater. (Progressing)       Start:  05/22/25    Expected End:  06/27/25               STG       Patient will correctly demonstrate independent performance of Home Exercise Program. (Progressing)       Start:  05/22/25    Expected End:  06/13/25            Pt will increase left knee active range of motion flexion to 100 degrees to improve functional joint mobility. (Not Progressing)       Start:  05/22/25    Expected End:  06/13/25            Pt will tolerate 25 minutes of therapeutic exercise with left knee pain no greater than 5/10. (Progressing)       Start:  05/22/25    Expected End:  06/13/25            Patient will successfully perform SLR to 45 degrees with no extensor lag. (Progressing)       Start:  05/22/25    Expected End:  06/13/25                Dain Chambers PTA

## 2025-06-11 ENCOUNTER — OFFICE VISIT (OUTPATIENT)
Dept: ORTHOPEDICS | Facility: CLINIC | Age: 40
End: 2025-06-11
Payer: MEDICAID

## 2025-06-11 ENCOUNTER — HOSPITAL ENCOUNTER (OUTPATIENT)
Dept: RADIOLOGY | Facility: HOSPITAL | Age: 40
Discharge: HOME | End: 2025-06-11
Attending: NURSE PRACTITIONER
Payer: MEDICAID

## 2025-06-11 VITALS
BODY MASS INDEX: 44.41 KG/M2 | OXYGEN SATURATION: 97 % | DIASTOLIC BLOOD PRESSURE: 97 MMHG | SYSTOLIC BLOOD PRESSURE: 145 MMHG | WEIGHT: 293 LBS | HEART RATE: 78 BPM | HEIGHT: 68 IN

## 2025-06-11 DIAGNOSIS — M79.641 RIGHT HAND PAIN: ICD-10-CM

## 2025-06-11 DIAGNOSIS — M79.641 RIGHT HAND PAIN: Primary | ICD-10-CM

## 2025-06-11 DIAGNOSIS — M25.562 PAIN IN BOTH KNEES, UNSPECIFIED CHRONICITY: ICD-10-CM

## 2025-06-11 DIAGNOSIS — M25.531 RIGHT WRIST PAIN: ICD-10-CM

## 2025-06-11 DIAGNOSIS — M25.561 PAIN IN BOTH KNEES, UNSPECIFIED CHRONICITY: ICD-10-CM

## 2025-06-11 PROCEDURE — 99213 OFFICE O/P EST LOW 20 MIN: CPT | Mod: S$PBB,,, | Performed by: NURSE PRACTITIONER

## 2025-06-11 PROCEDURE — 73130 X-RAY EXAM OF HAND: CPT | Mod: TC,RT

## 2025-06-11 PROCEDURE — 3077F SYST BP >= 140 MM HG: CPT | Mod: CPTII,,, | Performed by: NURSE PRACTITIONER

## 2025-06-11 PROCEDURE — 3080F DIAST BP >= 90 MM HG: CPT | Mod: CPTII,,, | Performed by: NURSE PRACTITIONER

## 2025-06-11 PROCEDURE — 99999 PR PBB SHADOW E&M-EST. PATIENT-LVL IV: CPT | Mod: PBBFAC,,, | Performed by: NURSE PRACTITIONER

## 2025-06-11 PROCEDURE — 3008F BODY MASS INDEX DOCD: CPT | Mod: CPTII,,, | Performed by: NURSE PRACTITIONER

## 2025-06-11 PROCEDURE — 99214 OFFICE O/P EST MOD 30 MIN: CPT | Mod: PBBFAC,25 | Performed by: NURSE PRACTITIONER

## 2025-06-11 PROCEDURE — 1159F MED LIST DOCD IN RCRD: CPT | Mod: CPTII,,, | Performed by: NURSE PRACTITIONER

## 2025-06-11 NOTE — PROGRESS NOTES
HPI:   Daja Alfaro is a pleasant 40 y.o. patient who reports to clinic for evaluation of right hand pain and numbness.     She describes the pain as a shooting pain in her wrist to her hand.   Her therapist is concerned she may have some nerve damage in her wrist/hand.  Injury onset and description: patient had a fall back in March where she injured her knee and hand.   She felt her hand pain would improve with time however pain is getting worse.  Reports pain in her wrist has been worse over the past 2 weeks.  She is ambulating with the use of a walker, very hard to use her walker.  She is very tender over the radial side of her wrist.  Tender along the Quervain tendon.  She is able to move her thumb.  She has some numbness in the base of the thumb as well as tingling and shooting pain.  She is currently in PT and OT, wrist pain is not improvin  Patient's occupation:   This is not a work related injury.   Patient is doing physical therapy and occupational therapy for her knee and hand at this time.   Her therapist suggested a can rather than a walker due to her hand pain.   This injury has been non-responsive to conservative care. The pain is worse with repetitive use, and strenuous activity is very difficult.    her pain improves with rest.    she rates pain as a  8/10on the Visual Analog Scale.        PAST MEDICAL HISTORY:   Past Medical History:   Diagnosis Date    Anemia, unspecified      PAST SURGICAL HISTORY:   Past Surgical History:   Procedure Laterality Date    CYSTOSCOPY N/A 12/6/2023    Procedure: CYSTOSCOPY;  Surgeon: Jeremiah Valdivia MD;  Location: [facility] OR;  Service: OB/GYN;  Laterality: N/A;    EXTERNAL EAR SURGERY Right     HAND SURGERY Right     HYSTERECTOMY, TOTAL, LAPAROSCOPIC, WITH SALPINGO-OOPHORECTOMY Right 12/6/2023    Procedure: HYSTERECTOMY,TOTAL,LAPAROSCOPIC,WITH SALPINGO-OOPHORECTOMY;  Surgeon: Jeremiah Valdivia MD;  Location: [facility] OR;  Service: OB/GYN;   "Laterality: Right;    TUBAL LIGATION       MEDICATIONS:  Current Medications[1]  ALLERGIES:   Review of patient's allergies indicates:   Allergen Reactions    Aspirin Hives         PHYSICAL EXAM:  VITAL SIGNS: BP (!) 145/97 (BP Location: Left arm, Patient Position: Sitting)   Pulse 78   Ht 5' 8" (1.727 m)   Wt (!) 152.4 kg (336 lb)   LMP 11/22/2023 (Approximate)   SpO2 97%   BMI 51.09 kg/m²   General: Well-developed well-nourished 40 y.o. femalein no acute distress;Cardiovascular: Regular rhythm by palpation of distal pulse, normal color and temperature, no concerning varicosities on symptomatic side Lungs: No labored breathing or wheezing appreciated Neuro: Alert and oriented ×3 Psychiatric: well oriented to person, place and time, demonstrates normal mood and affect Skin: No rashes, lesions or ulcers, normal temperature, turgor, and texture on uninvolved extremity                Right Hand/Wrist Exam     Inspection   Effusion: Wrist - present Hand -  absent  Bruising: Wrist - absent Hand -  absent    Tenderness   The patient is tender to palpation of the radial area.    Range of Motion     Wrist   Pronation:  abnormal   Supination:  abnormal           Muscle Strength   Right Upper Extremity   Wrist extension: 4/5   Wrist flexion: 4/5   : 4/5     Vascular Exam       Capillary Refill  Right Hand: normal capillary refill          IMAGING:  X-Ray Wrist Complete 3 views Right  Result Date: 5/20/2025  EXAMINATION: XR WRIST COMPLETE, 3 VIEWS, RIGHT CLINICAL HISTORY: Right wrist-hand pain. TECHNIQUE: PA, lateral, oblique views obtained. COMPARISON: No past imaging of right wrist at this time. FINDINGS: Bony mineralization is satisfactory. No fracture, dislocation or osseous destruction. No significant joint narrowing. Small osteophytes at medial margin of distal radial epiphysis. No significant soft tissue swelling.     No significant osseous abnormality. Electronically signed by: Ruddy Frazier Date:    05/20/2025 " "Time:    17:10        ASSESSMENT:      ICD-10-CM ICD-9-CM   1. Right hand pain  M79.641 729.5   2. Pain in both knees, unspecified chronicity  M25.561 719.46    M25.562    3. Right wrist pain  M25.531 719.43       PLAN:     -Findings and treatment options were discussed with the patient  -All questions answered  Removable wrist brace.  We will give her a prescription for a walking cane, she is having a hard time using the walker.  We will also order MRI of her right wrist and call with results    There are no Patient Instructions on file for this visit.  Orders Placed This Encounter   Procedures    CANE FOR HOME USE     Type of Cane::   Straight     Height::   5' 8" (1.727 m)     Weight::   152.4 kg (336 lb)     Length of need (1-99 months)::   6     Please check all that apply::   Patient's condition impairs ambulation.     Please check all that apply::   Patient is unable to safely ambulate without equipment.    X-Ray Hand 3 View Right     Standing Status:   Future     Number of Occurrences:   1     Expected Date:   6/11/2025     Expiration Date:   6/9/2026     May the Radiologist modify the order per protocol to meet the clinical needs of the patient?:   Yes     Release to patient:   Immediate    MRI Wrist Joint Without Contrast Right     Standing Status:   Future     Expected Date:   6/11/2025     Expiration Date:   6/11/2026     Does the patient have or ever had a pacemaker or a defibrillator (Note: Some facilities may not be able to schedule an MRI for patients with pacemakers and defibrillators. You should contact your local radiology dept to determine if this is the case.)?:   No     Does the patient have an aneurysm or surgical clip, pump, nerve/brain stimulator, middle/inner ear prosthesis, or other metal implant or foreign object (bullet, shrapnel)? If they have a card related to their implant, ask them to bring it. Issues related to the implant may cause the MRI to be delayed.:   No     Will the patient " require po anxiolysis or sedation?:   No     May the Radiologist modify the order per protocol to meet the clinical needs of the patient?:   Yes     Does the patient have on a skin patch for medication with aluminized backing?:   No     Procedures           [1]   Current Outpatient Medications:     acetaminophen (TYLENOL) 500 MG tablet, Take 500 mg by mouth every 6 (six) hours as needed., Disp: , Rfl:     diclofenac sodium (VOLTAREN ARTHRITIS PAIN) 1 % Gel, Apply 2 g topically 4 (four) times daily., Disp: 350 g, Rfl: 0    FEROSUL 325 mg (65 mg iron) Tab tablet, Take by mouth once daily., Disp: , Rfl:     hydrOXYzine pamoate (VISTARIL) 25 MG Cap, Take 25 mg by mouth nightly as needed., Disp: , Rfl:     mv-min/iron/folic/calcium/vitK (WOMEN'S MULTIVITAMIN ORAL), Take by mouth., Disp: , Rfl:     naproxen (NAPROSYN) 500 MG tablet, Take 1 tablet (500 mg total) by mouth 2 (two) times daily as needed., Disp: 20 tablet, Rfl: 0    paroxetine (PAXIL) 20 MG tablet, Take 20 mg by mouth every evening., Disp: , Rfl:

## 2025-06-12 ENCOUNTER — CLINICAL SUPPORT (OUTPATIENT)
Dept: REHABILITATION | Facility: HOSPITAL | Age: 40
End: 2025-06-12
Payer: MEDICAID

## 2025-06-12 DIAGNOSIS — M25.531 RIGHT WRIST PAIN: Primary | ICD-10-CM

## 2025-06-12 DIAGNOSIS — M25.562 LEFT KNEE PAIN, UNSPECIFIED CHRONICITY: Primary | ICD-10-CM

## 2025-06-12 PROCEDURE — 97032 APPL MODALITY 1+ESTIM EA 15: CPT

## 2025-06-12 PROCEDURE — 97014 ELECTRIC STIMULATION THERAPY: CPT | Mod: CQ

## 2025-06-12 PROCEDURE — 97110 THERAPEUTIC EXERCISES: CPT | Mod: CQ

## 2025-06-12 NOTE — PROGRESS NOTES
Outpatient Rehab    Occupational Therapy Visit    Patient Name: Daja Alfaro  MRN: [id number]  YOB: 1985  Encounter Date: 6/12/2025    Therapy Diagnosis:   Encounter Diagnosis   Name Primary?    Right wrist pain Yes     Physician: Janell Fernando FNP    Physician Orders: Eval and Treat  Medical Diagnosis: Right wrist pain  Surgical Diagnosis: Not applicable for this Episode   Surgical Date: Not applicable for this Episode    Visit # / Visits Authorized: 1 / 16  Insurance Authorization Period: 6/12/2025 to 8/12/2025  Date of Evaluation: 6/3/2025  6/3/2025  6/3/2025  Plan of Care Certification:       Time In: 0115   Time Out: 0140  Total Time (in minutes): 25   Total Billable Time (in minutes):      FOTO:  Intake Score:  %  Survey Score 2:  %  Survey Score 3:  %    Precautions:       Subjective   Pt presented to [facility] with 8/10 pain (throbbing, aching, shooting).  Pain reported as 8/10.      Objective            Treatment:  Other Activities  Activity 1: Estim: IFC Estim and cyrotherapy was appilied to the pt's R wrist due to increase pain and poor tolerance of movement. Pt's skin was intact pre/post Estim and she report a decreased in pain    Time Entry(in minutes):  E-Stim (Attended) Time Entry: 15    Assessment & Plan   Assessment: Pt reports having a visit the with Orthopedic NP Melody Arreola who will be ordering an MRI for further evaluation of her R wrist and to rule out a tendon tear.. Pt's is tender to touch and movement. Pt reports pain on radial side near thumb. Pt is able to complete R hand finger movement (AROM) but is unable to tolerate resisitive training such as ball squeezes. Pt adhered to OT's suggestion of a R wrist splint which she states has helped with pain. Pt was adminstered cryotherapy and Estim which help reduce pain from 8/10 to 7/10.  Evaluation/Treatment Tolerance: Patient limited by pain    The patient will continue to benefit from skilled outpatient  occupational therapy in order to address the deficits listed in the problem list on the initial evaluation, provide patient and family education, and maximize the patients level of independence in the home and community environments.     The patient's spiritual, cultural, and educational needs were considered, and the patient is agreeable to the plan of care and goals.           Plan:      Goals:   R wrist pain Problems       R wrist pain Problems (Active)       Functional outcome       Patient will show a significant change in FOTO patient-reported outcome tool to demonstrate subjective improvement (25.9)       Start:  06/03/25    Expected End:  07/08/25               Pain       Patient will report pain of 2/10 demonstrating a reduction of overall pain       Start:  06/03/25    Expected End:  07/08/25            Patient will report a 2 point reduction in pain while performing movement (from 8/10 to 6/10)       Start:  06/03/25    Expected End:  06/24/25                 decreased R wrist ROM Problems       decreased R wrist ROM Problems (Active)       Hygiene and grooming       Patient will style hair with no assistance       Start:  06/03/25    Expected End:  07/08/25               Range of Motion       Patient will achieve right wrist flexion ROM  by 5 degrees       Start:  06/03/25    Expected End:  06/24/25            Patient will achieve right wrist extension ROM 8-10 degrees       Start:  06/03/25    Expected End:  07/08/25                 decreased R wrist strength Problems       decreased R wrist strength Problems (Active)       Dressing       Patient will demonstrate independence with upper body donning and doffing pullover shirt        Start:  06/03/25    Expected End:  07/08/25               Hygiene and grooming       Patient will demonstrate oral care by brushing teeth with no assistance       Start:  06/03/25    Expected End:  06/24/25            Patient will bathe using RUE       Start:  06/03/25     Expected End:  07/08/25               Lifting & carrying objects       Patient will carry grocery inside house       Start:  06/03/25    Expected End:  07/08/25               Strength       Patient will achieve right wrist flexion strength of 3-/5       Start:  06/03/25    Expected End:  07/08/25            Patient will achieve right wrist extension strength of 3/5       Start:  06/03/25    Expected End:  07/08/25                   Dom Montaño OT

## 2025-06-12 NOTE — PROGRESS NOTES
Outpatient Rehab    Physical Therapy Visit    Patient Name: Djaa Alfaro  MRN: [id number]  YOB: 1985  Encounter Date: 6/12/2025    Therapy Diagnosis:   Encounter Diagnosis   Name Primary?    Left knee pain, unspecified chronicity Yes       Physician: Melody Arreola FNP    Physician Orders: Eval and Treat  Medical Diagnosis: Left knee pain, unspecified chronicity    Visit # / Visits Authorized:  3 / 10  Insurance Authorization Period: 6/3/2025 to 6/27/2025  Date of Evaluation: 5/22/2025  Plan of Care Certification: 5/22/2025 to 6/27/2025      PT/PTA: PTA   Number of PTA visits since last PT visit:2  Time In: 0141   Time Out: 0227  Total Time (in minutes): 46   Total Billable Time (in minutes):  46 Minutes     FOTO:  Intake Score: 18%  Survey Score 2:  %  Survey Score 3:  %    Precautions: none       Subjective   Patient complains of severe L knee pain.  Pain reported as 8/10. Pain has gotten better since yesterday which was a 10/10 per patient    Objective            Treatment:  Therapeutic Exercise  TE 2: left quad sets and SAQ big bolster 2 x 10  TE 3: seated left heel slides with towel 2 x 10  TE 4: Standing left SLR, abduction and extension 1 x 10  TE 5: Standing hamsring curls 1 x 10  TE 6: Supine Straight Leg Rasies 2 x 10  Modalities  Cryotherapy (Minutes\Location): Cold Pack applied to L knee inconjunction with IFC.  Electrical Stimulation (Parameters): IFC applied to L knee at 80/150hz and 8.4cv x 10 minutes in supine with large bolster under L knee.      Time Entry(in minutes):  E-Stim (Unattended) Time Entry: 10  Therapeutic Exercise Time Entry: 36    Assessment & Plan   Assessment: Patient toelrated treatment fairly well. IFC was applied to L knee and resulted in decreased pain per patient.  Evaluation/Treatment Tolerance: Patient limited by pain    The patient will continue to benefit from skilled outpatient physical therapy in order to address the deficits listed in the problem  list on the initial evaluation, provide patient and family education, and maximize the patients level of independence in the home and community environments.     The patient's spiritual, cultural, and educational needs were considered, and the patient is agreeable to the plan of care and goals.     Education  Education was done with Patient. The patient's learning style includes Demonstration and Listening. The patient Demonstrates understanding and Verbalizes understanding.                 Plan: Continue with therapy 2 x per week. Continue HEP.    Goals:   Active       LTG       Pt will increase left knee flexion to 120 degrees to improve functional gait mechanics for community mobility. (Progressing)       Start:  05/22/25    Expected End:  06/27/25            Pt will increase right knee strength to 4/5 to allow patient to safely return to prior level of function (Progressing)       Start:  05/22/25    Expected End:  06/27/25            Patient will report decreased left knee pain to 2/10 or less with all functional activities to return to PLOF. (Progressing)       Start:  05/22/25    Expected End:  06/27/25            Patient demonstrates improved overall function per FOTO Knee Survey Discharge Score to 40 or greater. (Progressing)       Start:  05/22/25    Expected End:  06/27/25               STG       Patient will correctly demonstrate independent performance of Home Exercise Program. (Met)       Start:  05/22/25    Expected End:  06/13/25    Resolved:  06/12/25         Pt will increase left knee active range of motion flexion to 100 degrees to improve functional joint mobility. (Progressing)       Start:  05/22/25    Expected End:  06/13/25            Pt will tolerate 25 minutes of therapeutic exercise with left knee pain no greater than 5/10. (Progressing)       Start:  05/22/25    Expected End:  06/13/25            Patient will successfully perform SLR to 45 degrees with no extensor lag. (Progressing)        Start:  05/22/25    Expected End:  06/13/25                Dain Chambers PTA

## 2025-06-19 ENCOUNTER — CLINICAL SUPPORT (OUTPATIENT)
Dept: REHABILITATION | Facility: HOSPITAL | Age: 40
End: 2025-06-19
Payer: MEDICAID

## 2025-06-19 DIAGNOSIS — M25.562 LEFT KNEE PAIN, UNSPECIFIED CHRONICITY: Primary | ICD-10-CM

## 2025-06-19 PROCEDURE — 97110 THERAPEUTIC EXERCISES: CPT | Mod: CQ

## 2025-06-19 PROCEDURE — 97014 ELECTRIC STIMULATION THERAPY: CPT | Mod: CQ

## 2025-06-19 NOTE — PROGRESS NOTES
Outpatient Rehab    Physical Therapy Visit    Patient Name: Daja Alfaro  MRN: [id number]  YOB: 1985  Encounter Date: 6/19/2025    Therapy Diagnosis:   Encounter Diagnosis   Name Primary?    Left knee pain, unspecified chronicity Yes     Physician: Melody Arreola FNP    Physician Orders: Eval and Treat  Medical Diagnosis: Left knee pain, unspecified chronicity  Surgical Diagnosis: Not applicable for this Episode   Surgical Date: Not applicable for this Episode  Days Since Last Surgery: Not applicable for this Episode    Visit # / Visits Authorized:  4 / 10  Insurance Authorization Period: 6/3/2025 to 6/27/2025  Date of Evaluation:   Plan of Care Certification:       PT/PTA: PTA   Number of PTA visits since last PT visit:3  Time In: 1100   Time Out: 1149  Total Time (in minutes): 49   Total Billable Time (in minutes):      FOTO:  Intake Score:  %  Survey Score 2:  %  Survey Score 3:  %    Precautions:       Subjective   Pt reports pain is better today..  Pain reported as 6/10.      Objective            Treatment:  Therapeutic Exercise  TE 1: Scifit Stepper 5 minutes level 0  TE 2: left quad sets and SAQ big bolster 2 x 10  TE 3: seated left heel slides with towel 2 x 10  TE 4: Standing left SLR, abduction and extension 1 x 10  TE 5: Standing hamsring curls 1 x 10  TE 6: Supine Straight Leg Rasies 2 x 10  Modalities  Cryotherapy (Minutes\Location): Cold Pack applied to L knee inconjunction with IFC.  Electrical Stimulation (Parameters): IFC applied to L knee at 80/150hz and 8.4cv x 10 minutes in supine with large bolster under L knee.    Time Entry(in minutes):  E-Stim (Unattended) Time Entry: 10  Therapeutic Exercise Time Entry: 39    Assessment & Plan   Assessment: Patient tolerates treatment well with an increase in pain noted with heel slides. IFC and cold pack applies post treatment session to decrease pain and inflammation. Will progress as able.  Evaluation/Treatment Tolerance: Patient  tolerated treatment well    The patient will continue to benefit from skilled outpatient physical therapy in order to address the deficits listed in the problem list on the initial evaluation, provide patient and family education, and maximize the patients level of independence in the home and community environments.     The patient's spiritual, cultural, and educational needs were considered, and the patient is agreeable to the plan of care and goals.           Plan:      Goals:     Anuradha Zelaya PTA

## 2025-06-24 ENCOUNTER — CLINICAL SUPPORT (OUTPATIENT)
Dept: REHABILITATION | Facility: HOSPITAL | Age: 40
End: 2025-06-24
Payer: MEDICAID

## 2025-06-24 DIAGNOSIS — M25.562 LEFT KNEE PAIN, UNSPECIFIED CHRONICITY: Primary | ICD-10-CM

## 2025-06-24 PROCEDURE — 97014 ELECTRIC STIMULATION THERAPY: CPT

## 2025-06-24 NOTE — PROGRESS NOTES
Outpatient Rehab    Physical Therapy Progress Note : Updated Plan of Care    Patient Name: Daja Alfaro  MRN: [id number]  YOB: 1985  Encounter Date: 6/24/2025    Therapy Diagnosis:   Encounter Diagnosis   Name Primary?    Left knee pain, unspecified chronicity Yes     Physician: Janell Fernando FNP    Physician Orders: Eval and Treat  Medical Diagnosis: Left knee pain, unspecified chronicity  Surgical Diagnosis: Not applicable for this Episode   Surgical Date: Not applicable for this Episode  Days Since Last Surgery: Not applicable for this Episode    Visit # / Visits Authorized:  6 / 10  Insurance Authorization Period: 6/3/2025 to 6/27/2025  Date of Evaluation: 5/22/2025   Plan of Care Certification: 5/22/2025 to 6/27/2025      PT/PTA: PT   Number of PTA visits since last PT visit:0  Time In: 1120   Time Out: 1140  Total Time (in minutes): 20   Total Billable Time (in minutes):  15    FOTO:  Intake Score: 18%  Survey Score 2: 18%  Survey Score 3:  %    Precautions:       Subjective   I feel on both my knees a couple of days ago and they both hurt..  Pain reported as 8/10. Bilateral knee pain. Therapist will withold exercises today due to pain and edema of left knee.    Objective   Knee Observations  Left Knee Observations  Not Present: Straight Leg Raise Extensor Lag             Knee Range of Motion   Left Knee   Active (deg) Passive (deg) Pain   Flexion 62 80     Extension 0 0                        Knee Strength   Right Strength Right Pain Left Strength Left  Pain   Flexion (S2)     3-     Prone Flexion     3-     Extension (L3)     3-       Knee Extensor Lag  No Lag: Left              Treatment:  Modalities  Cryotherapy (Minutes\Location): Cold Pack applied to L knee inconjunction with IFC.  Electrical Stimulation (Parameters): IFC applied to L knee at 80/150hz and 8.0 to 10.0 cv x 15 minutes in seated position.    Time Entry(in minutes):  E-Stim (Unattended) Time Entry: 15    Assessment &  Plan   Plan  From a physical therapy perspective, the patient would benefit from: Skilled Rehab Services    Planned therapy interventions include: Therapeutic exercise, Neuromuscular re-education, Manual therapy, and Gait training.    Planned modalities to include: Cryotherapy (cold pack), Electrical stimulation - passive/unattended, Thermotherapy (hot pack), and Ultrasound.        Visit Frequency: 2 times Per Week for 2 Weeks.       This plan was discussed with Patient and Therapy assistant.   Discussion participants: Agreed Upon Plan of Care  Plan details: Continue with Knee rehab program.          The patient will continue to benefit from skilled outpatient physical therapy in order to address the deficits listed in the problem list on the initial evaluation, provide patient and family education, and maximize the patients level of independence in the home and community environments.     The patient's spiritual, cultural, and educational needs were considered, and the patient is agreeable to the plan of care and goals.           Goals:   Active       LTG       Pt will increase left knee flexion to 120 degrees to improve functional gait mechanics for community mobility. (Progressing)       Start:  05/22/25    Expected End:  07/11/25            Pt will increase right knee strength to 4/5 to allow patient to safely return to prior level of function (Progressing)       Start:  05/22/25    Expected End:  07/11/25            Patient will report decreased left knee pain to 2/10 or less with all functional activities to return to PLOF. (Progressing)       Start:  05/22/25    Expected End:  07/11/25            Patient demonstrates improved overall function per FOTO Knee Survey Discharge Score to 40 or greater. (Progressing)       Start:  05/22/25    Expected End:  07/11/25               STG       Patient will correctly demonstrate independent performance of Home Exercise Program. (Met)       Start:  05/22/25    Expected  End:  06/13/25    Resolved:  06/12/25         Pt will increase left knee active range of motion flexion to 100 degrees to improve functional joint mobility. (Progressing)       Start:  05/22/25    Expected End:  07/04/25            Pt will tolerate 25 minutes of therapeutic exercise with left knee pain no greater than 5/10. (Progressing)       Start:  05/22/25    Expected End:  07/04/25            Patient will successfully perform SLR to 45 degrees with no extensor lag. (Progressing)       Start:  05/22/25    Expected End:  07/04/25                Lemuel Sue, PT

## 2025-06-26 ENCOUNTER — CLINICAL SUPPORT (OUTPATIENT)
Dept: REHABILITATION | Facility: HOSPITAL | Age: 40
End: 2025-06-26
Payer: MEDICAID

## 2025-06-26 DIAGNOSIS — M25.562 LEFT KNEE PAIN, UNSPECIFIED CHRONICITY: Primary | ICD-10-CM

## 2025-06-26 PROCEDURE — 97014 ELECTRIC STIMULATION THERAPY: CPT

## 2025-06-26 PROCEDURE — 97110 THERAPEUTIC EXERCISES: CPT

## 2025-06-26 NOTE — PROGRESS NOTES
Outpatient Rehab    Physical Therapy Visit    Patient Name: Daja Alfaro  MRN: [id number]  YOB: 1985  Encounter Date: 6/26/2025    Therapy Diagnosis:   Encounter Diagnosis   Name Primary?    Left knee pain, unspecified chronicity Yes     Physician: Melody Arreola FNP    Physician Orders: Eval and Treat  Medical Diagnosis: Left knee pain, unspecified chronicity  Surgical Diagnosis: Not applicable for this Episode   Surgical Date: Not applicable for this Episode  Days Since Last Surgery: Not applicable for this Episode    Visit # / Visits Authorized:  7 / 10  Insurance Authorization Period: 6/3/2025 to 7/11/2025  Date of Evaluation: 5/22/2025  Plan of Care Certification: 6/24/2025 to 7/11/2025      PT/PTA: PT   Number of PTA visits since last PT visit:0  Time In: 1112   Time Out: 1200  Total Time (in minutes): 48   Total Billable Time (in minutes):  48    FOTO:  Intake Score: 18%  Survey Score 2: 18%  Survey Score 3:  %    Precautions: none       Subjective   Both my knees are hurting today..  Pain reported as 6/10. Bilateral knee pain.    Objective            Treatment:  Therapeutic Exercise  TE 1: Scifit Stepper 5 minutes level 1  TE 2: left quad sets small bolster and SAQ big bolster 2 x 10  TE 3: seated left heel slides with towel 2 x 10  TE 5: Standing hamsring curls 1 x 10  TE 6: Supine Straight Leg Rasies, side hip abduction 2 x 10  Modalities  Cryotherapy (Minutes\Location): Cold Pack applied to L knee inconjunction with IFC.  Electrical Stimulation (Parameters): IFC applied to L knee at 80/150hz and 8.0 to 10.5 cv x 15 minutes in seated position.    Time Entry(in minutes):  E-Stim (Unattended) Time Entry: 15  Therapeutic Exercise Time Entry: 33    Assessment & Plan   Assessment: Daja presents today with continued chronic knee pain and antalgic gait. 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 She has not improved significantly with therapy  interventions secondary to chronic pain.   Evaluation/Treatment Tolerance: Patient limited by pain    The patient will continue to benefit from skilled outpatient physical therapy in order to address the deficits listed in the problem list on the initial evaluation, provide patient and family education, and maximize the patients level of independence in the home and community environments.     The patient's spiritual, cultural, and educational needs were considered, and the patient is agreeable to the plan of care and goals.           Plan: Continue with therapy 2 x per week. Continue HEP.    Goals:   Active       LTG       Pt will increase left knee flexion to 120 degrees to improve functional gait mechanics for community mobility. (Progressing)       Start:  05/22/25    Expected End:  07/11/25            Pt will increase right knee strength to 4/5 to allow patient to safely return to prior level of function (Progressing)       Start:  05/22/25    Expected End:  07/11/25            Patient will report decreased left knee pain to 2/10 or less with all functional activities to return to PLOF. (Progressing)       Start:  05/22/25    Expected End:  07/11/25            Patient demonstrates improved overall function per FOTO Knee Survey Discharge Score to 40 or greater. (Progressing)       Start:  05/22/25    Expected End:  07/11/25               STG       Patient will correctly demonstrate independent performance of Home Exercise Program. (Met)       Start:  05/22/25    Expected End:  06/13/25    Resolved:  06/12/25         Pt will increase left knee active range of motion flexion to 100 degrees to improve functional joint mobility. (Progressing)       Start:  05/22/25    Expected End:  07/04/25            Pt will tolerate 25 minutes of therapeutic exercise with left knee pain no greater than 5/10. (Progressing)       Start:  05/22/25    Expected End:  07/04/25            Patient will successfully perform SLR to 45  degrees with no extensor lag. (Progressing)       Start:  05/22/25    Expected End:  07/04/25                Lemuel Sue, PT

## 2025-07-01 ENCOUNTER — CLINICAL SUPPORT (OUTPATIENT)
Dept: REHABILITATION | Facility: HOSPITAL | Age: 40
End: 2025-07-01
Payer: MEDICAID

## 2025-07-01 DIAGNOSIS — M25.562 CHRONIC PAIN OF LEFT KNEE: Primary | ICD-10-CM

## 2025-07-01 DIAGNOSIS — G89.29 CHRONIC PAIN OF LEFT KNEE: Primary | ICD-10-CM

## 2025-07-01 PROCEDURE — 97014 ELECTRIC STIMULATION THERAPY: CPT | Mod: CQ

## 2025-07-01 PROCEDURE — 97110 THERAPEUTIC EXERCISES: CPT | Mod: CQ

## 2025-07-01 NOTE — PROGRESS NOTES
Outpatient Rehab    Physical Therapy Visit    Patient Name: Daja Alfaro  MRN: [id number]  YOB: 1985  Encounter Date: 7/1/2025    Therapy Diagnosis:   Encounter Diagnosis   Name Primary?    Chronic pain of left knee Yes     Physician: Melody Arreola FNP    Physician Orders: Eval and Treat  Medical Diagnosis: Left knee pain, unspecified chronicity  Surgical Diagnosis: Not applicable for this Episode   Surgical Date: Not applicable for this Episode  Days Since Last Surgery: Not applicable for this Episode    Visit # / Visits Authorized:  8 / 10  Insurance Authorization Period: 6/3/2025 to 7/11/2025  Date of Evaluation: 5/22/2025  Plan of Care Certification: 6/24/2025 to 7/11/2025      PT/PTA: PTA   Number of PTA visits since last PT visit:1  Time In: 1111   Time Out: 1150  Total Time (in minutes): 39   Total Billable Time (in minutes):  39 Minutes    FOTO:  Intake Score: 18%  Survey Score 2:  %  Survey Score 3:  %    Precautions: none       Subjective   mild/moderate L knee pain.  Pain reported as 6/10.      Objective            Treatment:  Therapeutic Exercise  TE 2: left quad sets small bolster and SAQ big bolster 2 x 10  TE 3: seated left heel slides with towel 2 x 10  TE 5: Standing hamsring curls 1 x 10  TE 6: Supine Straight Leg Rasies, side hip abduction 2 x 10  Modalities  Cryotherapy (Minutes\Location): Cold Pack applied to L knee inconjunction with IFC.  Electrical Stimulation (Parameters): IFC applied to L knee at 80/150hz and 8.0 to 10.5 cv x 15 minutes in seated position 100% scan    Time Entry(in minutes):  E-Stim (Unattended) Time Entry: 15  Therapeutic Exercise Time Entry: 24    Assessment & Plan   Assessment: Daja presents today with continued chronic knee pain and antalgic gait. Her left knee is still in considerable pain even with pain decreasing modalities. She has made an appointment with Pain clinic for pain injections.   Evaluation/Treatment Tolerance: Patient limited  by pain    The patient will continue to benefit from skilled outpatient physical therapy in order to address the deficits listed in the problem list on the initial evaluation, provide patient and family education, and maximize the patients level of independence in the home and community environments.     The patient's spiritual, cultural, and educational needs were considered, and the patient is agreeable to the plan of care and goals.     Education  Education was done with Patient. The patient's learning style includes Demonstration, Listening, and Tactile. The patient Demonstrates understanding and Verbalizes understanding.                 Plan: Continue with therapy 2 x per week. Continue HEP.    Goals:   Active       LTG       Pt will increase left knee flexion to 120 degrees to improve functional gait mechanics for community mobility. (Progressing)       Start:  05/22/25    Expected End:  07/11/25            Pt will increase right knee strength to 4/5 to allow patient to safely return to prior level of function (Progressing)       Start:  05/22/25    Expected End:  07/11/25            Patient will report decreased left knee pain to 2/10 or less with all functional activities to return to PLOF. (Progressing)       Start:  05/22/25    Expected End:  07/11/25            Patient demonstrates improved overall function per FOTO Knee Survey Discharge Score to 40 or greater. (Progressing)       Start:  05/22/25    Expected End:  07/11/25               STG       Patient will correctly demonstrate independent performance of Home Exercise Program. (Met)       Start:  05/22/25    Expected End:  06/13/25    Resolved:  06/12/25         Pt will increase left knee active range of motion flexion to 100 degrees to improve functional joint mobility. (Progressing)       Start:  05/22/25    Expected End:  07/04/25            Pt will tolerate 25 minutes of therapeutic exercise with left knee pain no greater than 5/10. (Progressing)        Start:  05/22/25    Expected End:  07/04/25            Patient will successfully perform SLR to 45 degrees with no extensor lag. (Progressing)       Start:  05/22/25    Expected End:  07/04/25                Dain Chambers PTA

## 2025-07-03 ENCOUNTER — CLINICAL SUPPORT (OUTPATIENT)
Dept: REHABILITATION | Facility: HOSPITAL | Age: 40
End: 2025-07-03
Payer: MEDICAID

## 2025-07-03 DIAGNOSIS — G89.29 CHRONIC PAIN OF LEFT KNEE: Primary | ICD-10-CM

## 2025-07-03 DIAGNOSIS — M25.562 CHRONIC PAIN OF LEFT KNEE: Primary | ICD-10-CM

## 2025-07-03 PROCEDURE — 97110 THERAPEUTIC EXERCISES: CPT

## 2025-07-03 NOTE — PROGRESS NOTES
Outpatient Rehab    Physical Therapy Visit    Patient Name: Daja Alfaro  MRN: [id number]  YOB: 1985  Encounter Date: 7/3/2025    Therapy Diagnosis:   Encounter Diagnosis   Name Primary?    Chronic pain of left knee Yes     Physician: Melody Arreola FNP    Physician Orders: Eval and Treat  Medical Diagnosis: Left knee pain, unspecified chronicity  Surgical Diagnosis: Not applicable for this Episode   Surgical Date: Not applicable for this Episode  Days Since Last Surgery: Not applicable for this Episode    Visit # / Visits Authorized:  9 / 10  Insurance Authorization Period: 6/3/2025 to 7/11/2025  Date of Evaluation: 5/22/2025  Plan of Care Certification: 6/24/2025 to 7/11/2025      PT/PTA: PT   Number of PTA visits since last PT visit:0  Time In: 1114   Time Out: 1144  Total Time (in minutes): 30   Total Billable Time (in minutes):  20    FOTO:  Intake Score: 18%  Survey Score 2: 18%  Survey Score 3:  %    Precautions:       Subjective   Moderate left knee paim.  Pain reported as 6/10. Left knee    Objective            Treatment:  Therapeutic Exercise  TE 1: Scifit Stepper 5 minutes level 1.5  TE 2: left quad sets small bolster and SAQ big bolster 2 x 10  TE 3: seated left heel slides with towel 2 x 10  TE 4: Standing left SLR, abduction and extension 1 x 10  TE 5: Standing hamsring curls 1 x 10  TE 6: Supine Straight Leg Rasies, side hip abduction 2 x 10      Time Entry(in minutes):  Therapeutic Exercise Time Entry: 20    Assessment & Plan   Assessment: Daja continues with chronic left knee pain unresolved with therapy intervention. She has a follow up visit with [facility] on 7/7/25 to discuss knee injections.  Evaluation/Treatment Tolerance: Patient limited by pain    The patient will continue to benefit from skilled outpatient physical therapy in order to address the deficits listed in the problem list on the initial evaluation, provide patient and family education, and  maximize the patients level of independence in the home and community environments.     The patient's spiritual, cultural, and educational needs were considered, and the patient is agreeable to the plan of care and goals.           Plan: Patient will return to orthopedics for follow up. Therapist will await further instructions from MD.    Goals:   Active       LTG       Pt will increase left knee flexion to 120 degrees to improve functional gait mechanics for community mobility. (Progressing)       Start:  05/22/25    Expected End:  07/11/25            Pt will increase right knee strength to 4/5 to allow patient to safely return to prior level of function (Progressing)       Start:  05/22/25    Expected End:  07/11/25            Patient will report decreased left knee pain to 2/10 or less with all functional activities to return to PLOF. (Unable to Meet)       Start:  05/22/25    Expected End:  07/11/25            Patient demonstrates improved overall function per FOTO Knee Survey Discharge Score to 40 or greater. (Progressing)       Start:  05/22/25    Expected End:  07/11/25               STG       Patient will correctly demonstrate independent performance of Home Exercise Program. (Met)       Start:  05/22/25    Expected End:  06/13/25    Resolved:  06/12/25         Pt will increase left knee active range of motion flexion to 100 degrees to improve functional joint mobility. (Progressing)       Start:  05/22/25    Expected End:  07/04/25            Pt will tolerate 25 minutes of therapeutic exercise with left knee pain no greater than 5/10. (Progressing)       Start:  05/22/25    Expected End:  07/04/25            Patient will successfully perform SLR to 45 degrees with no extensor lag. (Progressing)       Start:  05/22/25    Expected End:  07/04/25                Lemuel Sue, PT

## 2025-07-07 ENCOUNTER — OFFICE VISIT (OUTPATIENT)
Dept: ORTHOPEDICS | Facility: CLINIC | Age: 40
End: 2025-07-07
Payer: MEDICAID

## 2025-07-07 VITALS — WEIGHT: 293 LBS | HEIGHT: 68 IN | BODY MASS INDEX: 44.41 KG/M2

## 2025-07-07 DIAGNOSIS — M25.562 PAIN IN BOTH KNEES, UNSPECIFIED CHRONICITY: Primary | ICD-10-CM

## 2025-07-07 DIAGNOSIS — M25.561 PAIN IN BOTH KNEES, UNSPECIFIED CHRONICITY: Primary | ICD-10-CM

## 2025-07-07 PROCEDURE — 99213 OFFICE O/P EST LOW 20 MIN: CPT | Mod: PBBFAC | Performed by: NURSE PRACTITIONER

## 2025-07-07 PROCEDURE — 99999PBSHW PR PBB SHADOW TECHNICAL ONLY FILED TO HB: Mod: PBBFAC,,,

## 2025-07-07 PROCEDURE — 20610 DRAIN/INJ JOINT/BURSA W/O US: CPT | Mod: PBBFAC,RT | Performed by: NURSE PRACTITIONER

## 2025-07-07 PROCEDURE — 99213 OFFICE O/P EST LOW 20 MIN: CPT | Mod: S$PBB,25,, | Performed by: NURSE PRACTITIONER

## 2025-07-07 PROCEDURE — 3008F BODY MASS INDEX DOCD: CPT | Mod: CPTII,,, | Performed by: NURSE PRACTITIONER

## 2025-07-07 PROCEDURE — 1159F MED LIST DOCD IN RCRD: CPT | Mod: CPTII,,, | Performed by: NURSE PRACTITIONER

## 2025-07-07 PROCEDURE — 99999 PR PBB SHADOW E&M-EST. PATIENT-LVL III: CPT | Mod: PBBFAC,,, | Performed by: NURSE PRACTITIONER

## 2025-07-07 PROCEDURE — 20610 DRAIN/INJ JOINT/BURSA W/O US: CPT | Mod: PBBFAC,LT | Performed by: NURSE PRACTITIONER

## 2025-07-07 RX ADMIN — TRIAMCINOLONE ACETONIDE 40 MG: 40 INJECTION, SUSPENSION INTRA-ARTICULAR; INTRAMUSCULAR at 01:07

## 2025-07-07 NOTE — PROGRESS NOTES
"  CC:  Knee pain    40 y.o. Female returns to clinic for a follow up visit regarding knee pain.       Patient has been in physical therapy here at [facility] and only had one visit left.   She reports her knee pain is no better in her left knee and she is having pain in her right knee due to putting most of her weight on her right knee.   She would like to discuss injections today as the therapy has not helped relieve her symptoms.          Past Medical History:   Diagnosis Date    Anemia, unspecified      Past Surgical History:   Procedure Laterality Date    CYSTOSCOPY N/A 12/6/2023    Procedure: CYSTOSCOPY;  Surgeon: Jeremiah Valdivia MD;  Location: [facility] OR;  Service: OB/GYN;  Laterality: N/A;    EXTERNAL EAR SURGERY Right     HAND SURGERY Right     HYSTERECTOMY, TOTAL, LAPAROSCOPIC, WITH SALPINGO-OOPHORECTOMY Right 12/6/2023    Procedure: HYSTERECTOMY,TOTAL,LAPAROSCOPIC,WITH SALPINGO-OOPHORECTOMY;  Surgeon: Jeremiah Valdivia MD;  Location: [facility] OR;  Service: OB/GYN;  Laterality: Right;    TUBAL LIGATION           PHYSICAL EXAMINATION:  Ht 5' 8" (1.727 m)   Wt (!) 152.4 kg (335 lb 15.7 oz)   LMP 11/22/2023 (Approximate)   BMI 51.09 kg/m²             Right Knee Exam     Inspection   Swelling: absent  Bruising: absent    Tenderness   The patient is tender to palpation of the medial joint line and lateral joint line.    Range of Motion   Extension:  normal   Flexion:  normal     Other   Sensation: normal    Left Knee Exam     Inspection   Swelling: present  Bruising: absent    Tenderness   The patient tender to palpation of the medial joint line and patella.    Range of Motion   Extension:  normal   Flexion:  normal     Other   Sensation: normal    IMAGING:  X-Ray Hand 3 View Right  Result Date: 6/16/2025  EXAMINATION: XR HAND COMPLETE 3 VIEW RIGHT CLINICAL HISTORY: Pain in right hand TECHNIQUE: PA, lateral, and oblique views of the right hand were performed. COMPARISON: None FINDINGS: The " osseous structures are intact without evidence of fracture or osseous destruction.  No significant degree of degenerative change is noted.     As above Electronically signed by: Soumya Tolbert MD Date:    06/16/2025 Time:    13:19       ASSESSMENT:      ICD-10-CM ICD-9-CM   1. Pain in both knees, unspecified chronicity  M25.561 719.46    M25.562        PLAN:     -Findings and treatment options were discussed with the patient  -All questions answered  Natural history and expected course discussed. Questions answered.  Educational materials distributed.  Reduction in offending activity.  OTC analgesics as needed.  Arthrocentesis. See procedure note.  Bilateral knee injections today.  Return to clinic in 8 weeks for recheck.  If her left knee is no better we will set her up with 1 of the doctors to discuss options moving forward.    There are no Patient Instructions on file for this visit.      No orders of the defined types were placed in this encounter.        Large Joint Aspiration/Injection: L supra patellar bursa    Date/Time: 7/7/2025 1:40 PM    Performed by: Melody Arreola FNP  Authorized by: Melody Arreola FNP    Consent Done?:  Yes (Verbal)  Indications:  Pain  Site marked: the procedure site was marked    Local anesthetic:  Bupivacaine 0.25% without epinephrine    Details:  Needle Size:  22 G  Location:  Knee  Site:  L supra patellar bursa  Medications:  40 mg triamcinolone acetonide 40 mg/mL  Patient tolerance:  Patient tolerated the procedure well with no immediate complications  Large Joint Aspiration/Injection: R supra patellar bursa    Date/Time: 7/7/2025 1:40 PM    Performed by: Melody Arreola FNP  Authorized by: Melody Arreola FNP    Consent Done?:  Yes (Verbal)  Indications:  Pain  Site marked: the procedure site was marked    Local anesthetic:  Bupivacaine 0.25% without epinephrine    Details:  Needle Size:  22 G  Location:  Knee  Site:  R supra patellar bursa  Medications:  40 mg  triamcinolone acetonide 40 mg/mL  Patient tolerance:  Patient tolerated the procedure well with no immediate complications

## 2025-07-08 ENCOUNTER — DOCUMENTATION ONLY (OUTPATIENT)
Dept: REHABILITATION | Facility: HOSPITAL | Age: 40
End: 2025-07-08
Payer: MEDICAID

## 2025-07-08 RX ORDER — TRIAMCINOLONE ACETONIDE 40 MG/ML
40 INJECTION, SUSPENSION INTRA-ARTICULAR; INTRAMUSCULAR
Status: DISCONTINUED | OUTPATIENT
Start: 2025-07-07 | End: 2025-07-08 | Stop reason: HOSPADM

## 2025-07-08 NOTE — PROGRESS NOTES
Outpatient Rehab    Physical Therapy Discharge    Patient Name: Daja Alfaro  MRN: [id number]  YOB: 1985  Encounter Date: 7/8/2025    Therapy Diagnosis:        Encounter Diagnosis   Name Primary?    Chronic pain of left knee Yes      Physician: Melody Arreola FNP     Physician Orders: Eval and Treat  Medical Diagnosis: Left knee pain, unspecified chronicity  Surgical Diagnosis: Not applicable for this Episode   Surgical Date: Not applicable for this Episode  Days Since Last Surgery: Not applicable for this Episode     Visit # / Visits Authorized:  9 / 10  Insurance Authorization Period: 6/3/2025 to 7/11/2025  Date of Evaluation: 5/22/2025  Plan of Care Certification: 6/24/2025 to 7/11/2025     FOTO:  Intake Score: 18 %  Survey Score 2:  18%  Survey Score 3:  %    Precautions: none       Subjective  Patient has returned to [facility] for knee injections and per patient will not be returning to therapy.            Objective    none             Assessment & Plan   Assessment:   Patient has been released from therapy per physician.       The patient's spiritual, cultural, and educational needs were considered, and the patient is agreeable to the plan of care and goals.           Plan:  Discharge therapy services.    Goals:     LTG         Pt will increase left knee flexion to 120 degrees to improve functional gait mechanics for community mobility. (Progressing)         Start:  05/22/25    Expected End:  07/11/25              Pt will increase right knee strength to 4/5 to allow patient to safely return to prior level of function (Progressing)         Start:  05/22/25    Expected End:  07/11/25              Patient will report decreased left knee pain to 2/10 or less with all functional activities to return to PLOF. (Unable to Meet)         Start:  05/22/25    Expected End:  07/11/25              Patient demonstrates improved overall function per FOTO Knee Survey Discharge Score to 40 or greater.  (Progressing)         Start:  05/22/25    Expected End:  07/11/25                 STG         Patient will correctly demonstrate independent performance of Home Exercise Program. (Met)         Start:  05/22/25    Expected End:  06/13/25    Resolved:  06/12/25           Pt will increase left knee active range of motion flexion to 100 degrees to improve functional joint mobility. (Progressing)         Start:  05/22/25    Expected End:  07/04/25              Pt will tolerate 25 minutes of therapeutic exercise with left knee pain no greater than 5/10. (Progressing)         Start:  05/22/25    Expected End:  07/04/25              Patient will successfully perform SLR to 45 degrees with no extensor lag. (Progressing)         Start:  05/22/25    Expected End:  07/04/25                Lemuel Sue, PT

## 2025-07-14 ENCOUNTER — TELEPHONE (OUTPATIENT)
Dept: ORTHOPEDICS | Facility: CLINIC | Age: 40
End: 2025-07-14
Payer: MEDICAID

## 2025-07-14 NOTE — TELEPHONE ENCOUNTER
Copied from CRM #[id number]. Topic: General Inquiry - Test Results  >> Jul 14, 2025  1:11 PM Jennifer wrote:  Who Called: Daja Alfaro    Caller is requesting information on test results.    Name of Test (Lab/Mammo/Etc): MRI   Date of Test: 07/08   Where the test was performed:   Ordering Provider:     Preferred Method of Contact: Phone Call  Patient's Preferred Phone Number on File: [phone]  Best Call Back Number, if different:  Additional Information:

## 2025-07-18 ENCOUNTER — OFFICE VISIT (OUTPATIENT)
Dept: FAMILY MEDICINE | Facility: CLINIC | Age: 40
End: 2025-07-18
Payer: MEDICAID

## 2025-07-18 VITALS
WEIGHT: 293 LBS | HEART RATE: 80 BPM | TEMPERATURE: 99 F | SYSTOLIC BLOOD PRESSURE: 137 MMHG | DIASTOLIC BLOOD PRESSURE: 79 MMHG | OXYGEN SATURATION: 96 % | HEIGHT: 68 IN | BODY MASS INDEX: 44.41 KG/M2 | RESPIRATION RATE: 18 BRPM

## 2025-07-18 DIAGNOSIS — Z12.31 ENCOUNTER FOR SCREENING MAMMOGRAM FOR MALIGNANT NEOPLASM OF BREAST: ICD-10-CM

## 2025-07-18 DIAGNOSIS — M25.561 RIGHT KNEE PAIN, UNSPECIFIED CHRONICITY: Primary | ICD-10-CM

## 2025-07-18 DIAGNOSIS — M25.531 RIGHT WRIST PAIN: ICD-10-CM

## 2025-07-18 DIAGNOSIS — M25.562 LEFT KNEE PAIN, UNSPECIFIED CHRONICITY: ICD-10-CM

## 2025-07-18 DIAGNOSIS — Z13.220 SCREENING, LIPID: ICD-10-CM

## 2025-07-18 DIAGNOSIS — D64.9 ANEMIA, UNSPECIFIED TYPE: ICD-10-CM

## 2025-07-18 LAB
ALBUMIN SERPL BCP-MCNC: 3.7 G/DL (ref 3.5–5)
ALBUMIN/GLOB SERPL: 1.1 {RATIO}
ALP SERPL-CCNC: 85 U/L (ref 40–150)
ALT SERPL W P-5'-P-CCNC: 21 U/L
ANION GAP SERPL CALCULATED.3IONS-SCNC: 14 MMOL/L (ref 7–16)
AST SERPL W P-5'-P-CCNC: 22 U/L (ref 11–45)
BASOPHILS # BLD AUTO: 0.02 K/UL (ref 0–0.2)
BASOPHILS NFR BLD AUTO: 0.3 % (ref 0–1)
BILIRUB SERPL-MCNC: 0.6 MG/DL
BUN SERPL-MCNC: 10 MG/DL (ref 7–19)
BUN/CREAT SERPL: 12 (ref 6–20)
CALCIUM SERPL-MCNC: 9.1 MG/DL (ref 8.4–10.2)
CHLORIDE SERPL-SCNC: 107 MMOL/L (ref 98–107)
CHOLEST SERPL-MCNC: 174 MG/DL
CHOLEST/HDLC SERPL: 3.3 {RATIO}
CO2 SERPL-SCNC: 25 MMOL/L (ref 22–29)
CREAT SERPL-MCNC: 0.85 MG/DL (ref 0.55–1.02)
DIFFERENTIAL METHOD BLD: ABNORMAL
EGFR (NO RACE VARIABLE) (RUSH/TITUS): 89 ML/MIN/1.73M2
EOSINOPHIL # BLD AUTO: 0.03 K/UL (ref 0–0.5)
EOSINOPHIL NFR BLD AUTO: 0.4 % (ref 1–4)
ERYTHROCYTE [DISTWIDTH] IN BLOOD BY AUTOMATED COUNT: 13.9 % (ref 11.5–14.5)
GLOBULIN SER-MCNC: 3.5 G/DL (ref 2–4)
GLUCOSE SERPL-MCNC: 96 MG/DL (ref 74–100)
HCT VFR BLD AUTO: 44.1 % (ref 38–47)
HDLC SERPL-MCNC: 52 MG/DL (ref 35–60)
HGB BLD-MCNC: 13.4 G/DL (ref 12–16)
IMM GRANULOCYTES # BLD AUTO: 0.02 K/UL (ref 0–0.04)
IMM GRANULOCYTES NFR BLD: 0.3 % (ref 0–0.4)
IRON SATN MFR SERPL: 36 % (ref 20–50)
IRON SERPL-MCNC: 107 UG/DL (ref 50–170)
LDLC SERPL CALC-MCNC: 108 MG/DL
LDLC/HDLC SERPL: 2.1 {RATIO}
LYMPHOCYTES # BLD AUTO: 1.32 K/UL (ref 1–4.8)
LYMPHOCYTES NFR BLD AUTO: 19.3 % (ref 27–41)
MCH RBC QN AUTO: 26.9 PG (ref 27–31)
MCHC RBC AUTO-ENTMCNC: 30.4 G/DL (ref 32–36)
MCV RBC AUTO: 88.6 FL (ref 80–96)
MONOCYTES # BLD AUTO: 0.28 K/UL (ref 0–0.8)
MONOCYTES NFR BLD AUTO: 4.1 % (ref 2–6)
MPC BLD CALC-MCNC: 10 FL (ref 9.4–12.4)
NEUTROPHILS # BLD AUTO: 5.17 K/UL (ref 1.8–7.7)
NEUTROPHILS NFR BLD AUTO: 75.6 % (ref 53–65)
NONHDLC SERPL-MCNC: 122 MG/DL
NRBC # BLD AUTO: 0 X10E3/UL
NRBC, AUTO (.00): 0 %
PLATELET # BLD AUTO: 327 K/UL (ref 150–400)
POTASSIUM SERPL-SCNC: 4.1 MMOL/L (ref 3.5–5.1)
PROT SERPL-MCNC: 7.2 G/DL (ref 6.4–8.3)
RBC # BLD AUTO: 4.98 M/UL (ref 4.2–5.4)
SODIUM SERPL-SCNC: 142 MMOL/L (ref 136–145)
TIBC SERPL-MCNC: 192 UG/DL (ref 70–310)
TIBC SERPL-MCNC: 299 UG/DL (ref 250–450)
TRANSFERRIN SERPL-MCNC: 264 MG/DL (ref 180–382)
TRIGL SERPL-MCNC: 68 MG/DL (ref 37–140)
VIT B12 SERPL-MCNC: 382 PG/ML (ref 213–816)
VLDLC SERPL-MCNC: 14 MG/DL
WBC # BLD AUTO: 6.84 K/UL (ref 4.5–11)

## 2025-07-18 PROCEDURE — 83540 ASSAY OF IRON: CPT | Mod: ,,, | Performed by: CLINICAL MEDICAL LABORATORY

## 2025-07-18 PROCEDURE — 82607 VITAMIN B-12: CPT | Mod: ,,, | Performed by: CLINICAL MEDICAL LABORATORY

## 2025-07-18 PROCEDURE — 80053 COMPREHEN METABOLIC PANEL: CPT | Mod: ,,, | Performed by: CLINICAL MEDICAL LABORATORY

## 2025-07-18 PROCEDURE — 83550 IRON BINDING TEST: CPT | Mod: ,,, | Performed by: CLINICAL MEDICAL LABORATORY

## 2025-07-18 PROCEDURE — 80061 LIPID PANEL: CPT | Mod: ,,, | Performed by: CLINICAL MEDICAL LABORATORY

## 2025-07-18 PROCEDURE — 85025 COMPLETE CBC W/AUTO DIFF WBC: CPT | Mod: ,,, | Performed by: CLINICAL MEDICAL LABORATORY

## 2025-07-18 RX ORDER — NAPROXEN 500 MG/1
500 TABLET ORAL 2 TIMES DAILY PRN
Qty: 30 TABLET | Refills: 0 | Status: SHIPPED | OUTPATIENT
Start: 2025-07-18

## 2025-07-18 NOTE — PROGRESS NOTES
"Clinic Note    Daja Alfaro is a 40 y.o. female     Chief Complaint:   Chief Complaint   Patient presents with    Leg Pain     Pt reports chronic bilateral leg pain         Subjective:    Patient complains of bilateral knee pain and wrist pain. Patient states she completed therapy. Denies therapy providing relief. States she did received steroid injections to knees last week. Patient has a follow up with ortho next week for ganglion cyst to right wrist. Wearing wrist splint. States stopped OT per ortho.   Patient states she was doing better with joints and then fell on knees. States recent death in her family.   Patient states she is going to follow up with [facility]. Denies taking paxil due to side effects.  States she is taking iron which does seem to help. Admits to constipation.         Allergies:   Review of patient's allergies indicates:   Allergen Reactions    Aspirin Hives    Bee pollen Hives        Past Medical History:  Past Medical History:   Diagnosis Date    Anemia, unspecified         Current Medications:  Current Medications[1]       Review of Systems   Constitutional:  Negative for fever.   Respiratory:  Negative for cough and shortness of breath.    Cardiovascular:  Negative for chest pain, palpitations and leg swelling.   Gastrointestinal:  Positive for constipation. Negative for abdominal pain, diarrhea, nausea and vomiting.   Genitourinary:  Negative for dysuria.   Musculoskeletal:  Positive for arthralgias, back pain, gait problem and leg pain.   Neurological:  Negative for headaches.          Objective:    /79 (BP Location: Left arm, Patient Position: Sitting)   Pulse 80   Temp 98.6 °F (37 °C) (Oral)   Resp 18   Ht 5' 8" (1.727 m)   Wt (!) 148.8 kg (328 lb)   LMP 11/22/2023 (Approximate)   SpO2 96%   BMI 49.87 kg/m²      Physical Exam  Constitutional:       Appearance: Normal appearance. She is obese.   Eyes:      Extraocular Movements: Extraocular movements intact. "   Cardiovascular:      Rate and Rhythm: Normal rate and regular rhythm.      Pulses: Normal pulses.      Heart sounds: Normal heart sounds.   Pulmonary:      Effort: Pulmonary effort is normal.      Breath sounds: Normal breath sounds.   Abdominal:      Palpations: Abdomen is soft.      Tenderness: There is no abdominal tenderness. There is no guarding or rebound.   Musculoskeletal:      Right lower leg: No edema.      Left lower leg: No edema.      Comments: cane   Skin:     General: Skin is warm and dry.   Neurological:      Mental Status: She is alert and oriented to person, place, and time.      Gait: Gait abnormal.   Psychiatric:         Mood and Affect: Mood normal.          Assessment and Plan:    1. Right knee pain, unspecified chronicity    2. Left knee pain, unspecified chronicity    3. Right wrist pain    4. Anemia, unspecified type    5. Screening, lipid    6. Encounter for screening mammogram for malignant neoplasm of breast         Right knee pain, unspecified chronicity  -     naproxen (NAPROSYN) 500 MG tablet; Take 1 tablet (500 mg total) by mouth 2 (two) times daily as needed.  Dispense: 30 tablet; Refill: 0    Left knee pain, unspecified chronicity  -     naproxen (NAPROSYN) 500 MG tablet; Take 1 tablet (500 mg total) by mouth 2 (two) times daily as needed.  Dispense: 30 tablet; Refill: 0    Right wrist pain  -     naproxen (NAPROSYN) 500 MG tablet; Take 1 tablet (500 mg total) by mouth 2 (two) times daily as needed.  Dispense: 30 tablet; Refill: 0    Anemia, unspecified type  -     CBC Auto Differential; Future; Expected date: 07/18/2025  -     Comprehensive Metabolic Panel; Future; Expected date: 07/18/2025  -     Iron and TIBC; Future; Expected date: 07/18/2025  -     Vitamin B12; Future; Expected date: 07/18/2025    Screening, lipid  -     Lipid Panel; Future; Expected date: 07/18/2025    Encounter for screening mammogram for malignant neoplasm of breast  -     Mammo Digital Screening [facility] w/  Shah[facility] (XPD); Future; Expected date: 07/18/2025          There are no Patient Instructions on file for this visit.   Follow up in about 3 months (around 10/18/2025), or if symptoms worsen or fail to improve.          [1]   Current Outpatient Medications:     acetaminophen (TYLENOL) 500 MG tablet, Take 500 mg by mouth every 6 (six) hours as needed., Disp: , Rfl:     diclofenac sodium (VOLTAREN ARTHRITIS PAIN) 1 % Gel, Apply 2 g topically 4 (four) times daily., Disp: 350 g, Rfl: 0    FEROSUL 325 mg (65 mg iron) Tab tablet, Take by mouth once daily., Disp: , Rfl:     hydrOXYzine pamoate (VISTARIL) 25 MG Cap, Take 25 mg by mouth nightly as needed., Disp: , Rfl:     mv-min/iron/folic/calcium/vitK (WOMEN'S MULTIVITAMIN ORAL), Take by mouth., Disp: , Rfl:     naproxen (NAPROSYN) 500 MG tablet, Take 1 tablet (500 mg total) by mouth 2 (two) times daily as needed., Disp: 30 tablet, Rfl: 0

## 2025-07-30 ENCOUNTER — OFFICE VISIT (OUTPATIENT)
Dept: ORTHOPEDICS | Facility: CLINIC | Age: 40
End: 2025-07-30
Payer: MEDICAID

## 2025-07-30 VITALS
OXYGEN SATURATION: 98 % | HEART RATE: 75 BPM | SYSTOLIC BLOOD PRESSURE: 161 MMHG | BODY MASS INDEX: 44.41 KG/M2 | DIASTOLIC BLOOD PRESSURE: 90 MMHG | HEIGHT: 68 IN | WEIGHT: 293 LBS

## 2025-07-30 DIAGNOSIS — M65.4 RADIAL STYLOID TENOSYNOVITIS (DE QUERVAIN): ICD-10-CM

## 2025-07-30 DIAGNOSIS — M25.531 RIGHT WRIST PAIN: Primary | ICD-10-CM

## 2025-07-30 PROCEDURE — 99999PBSHW PR PBB SHADOW TECHNICAL ONLY FILED TO HB: Mod: PBBFAC,,,

## 2025-07-30 PROCEDURE — 99213 OFFICE O/P EST LOW 20 MIN: CPT | Mod: PBBFAC | Performed by: ORTHOPAEDIC SURGERY

## 2025-07-30 PROCEDURE — 99999 PR PBB SHADOW E&M-EST. PATIENT-LVL III: CPT | Mod: PBBFAC,,, | Performed by: ORTHOPAEDIC SURGERY

## 2025-07-30 PROCEDURE — 20550 NJX 1 TENDON SHEATH/LIGAMENT: CPT | Mod: PBBFAC,RT | Performed by: ORTHOPAEDIC SURGERY

## 2025-07-30 RX ORDER — TRIAMCINOLONE ACETONIDE 40 MG/ML
20 INJECTION, SUSPENSION INTRA-ARTICULAR; INTRAMUSCULAR
Status: DISCONTINUED | OUTPATIENT
Start: 2025-07-30 | End: 2025-07-30 | Stop reason: HOSPADM

## 2025-07-30 RX ORDER — BUPIVACAINE HYDROCHLORIDE 2.5 MG/ML
25 INJECTION, SOLUTION EPIDURAL; INFILTRATION; INTRACAUDAL; PERINEURAL
Status: DISCONTINUED | OUTPATIENT
Start: 2025-07-30 | End: 2025-07-30 | Stop reason: HOSPADM

## 2025-07-30 RX ADMIN — BUPIVACAINE HYDROCHLORIDE 25 MG: 2.5 INJECTION, SOLUTION EPIDURAL; INFILTRATION; INTRACAUDAL; PERINEURAL at 09:07

## 2025-07-30 RX ADMIN — TRIAMCINOLONE ACETONIDE 20 MG: 40 INJECTION, SUSPENSION INTRA-ARTICULAR; INTRAMUSCULAR at 09:07

## 2025-07-30 NOTE — PROCEDURES
Tendon Sheath    Date/Time: 7/30/2025 9:40 AM    Performed by: Buzz Hurtado MD  Authorized by: Buzz Hurtado MD    Consent Done?:  Yes (Verbal)  Indications:  Pain  Location:  Wrist  Site:  R first doral compartment  Ultrasonic guidance for needle placement?: No    Needle size:  25 G  Approach:  Radial  Medications:  25 mg BUPivacaine (PF) 0.25% (2.5 mg/ml) 0.25 % (2.5 mg/mL); 20 mg triamcinolone acetonide 40 mg/mL  Patient tolerance:  Patient tolerated the procedure well with no immediate complications

## 2025-07-30 NOTE — PROGRESS NOTES
Patient is here for right wrist pain.  She has a small cyst on the volar aspect of the 1st metacarpal base.  At this time I am not palpating it in his deep to the soft tissues.  She has had previous surgery there.  Her pain in his over 1st extensor compartment.  Positive Finkelstein's test.  Pain on resisted extension of the thumb.  Pain on grasping.  No instability in the wrist in his noted.  She is wearing a thumb spica splint.  We discussed treatment options.  I injected her 1st extensor compartment with 0.5 cc Marcaine 0.5 cc Kenalog.  Let her wear the brace.  Recheck her in 6 weeks.  I think this should resolve her symptoms.  If it does not then we discussed possible release but I think that her symptoms will resolve with the injection.

## 2025-08-06 ENCOUNTER — OFFICE VISIT (OUTPATIENT)
Dept: FAMILY MEDICINE | Facility: CLINIC | Age: 40
End: 2025-08-06
Payer: MEDICAID

## 2025-08-06 VITALS
HEART RATE: 84 BPM | BODY MASS INDEX: 44.41 KG/M2 | RESPIRATION RATE: 20 BRPM | DIASTOLIC BLOOD PRESSURE: 82 MMHG | SYSTOLIC BLOOD PRESSURE: 122 MMHG | WEIGHT: 293 LBS | TEMPERATURE: 99 F | OXYGEN SATURATION: 100 % | HEIGHT: 68 IN

## 2025-08-06 DIAGNOSIS — F41.9 ANXIETY: Primary | ICD-10-CM

## 2025-08-06 DIAGNOSIS — F32.A DEPRESSION, UNSPECIFIED DEPRESSION TYPE: ICD-10-CM

## 2025-08-06 DIAGNOSIS — G47.00 INSOMNIA, UNSPECIFIED TYPE: ICD-10-CM

## 2025-08-06 PROCEDURE — 1160F RVW MEDS BY RX/DR IN RCRD: CPT | Mod: CPTII,,, | Performed by: NURSE PRACTITIONER

## 2025-08-06 PROCEDURE — 1159F MED LIST DOCD IN RCRD: CPT | Mod: CPTII,,, | Performed by: NURSE PRACTITIONER

## 2025-08-06 PROCEDURE — 99213 OFFICE O/P EST LOW 20 MIN: CPT | Mod: ,,, | Performed by: NURSE PRACTITIONER

## 2025-08-06 PROCEDURE — 3008F BODY MASS INDEX DOCD: CPT | Mod: CPTII,,, | Performed by: NURSE PRACTITIONER

## 2025-08-06 PROCEDURE — 3074F SYST BP LT 130 MM HG: CPT | Mod: CPTII,,, | Performed by: NURSE PRACTITIONER

## 2025-08-06 PROCEDURE — 3079F DIAST BP 80-89 MM HG: CPT | Mod: CPTII,,, | Performed by: NURSE PRACTITIONER

## 2025-08-06 RX ORDER — PAROXETINE 20 MG/1
20 TABLET, FILM COATED ORAL EVERY MORNING
COMMUNITY
End: 2025-08-06 | Stop reason: SDUPTHER

## 2025-08-06 RX ORDER — TRAZODONE HYDROCHLORIDE 50 MG/1
50 TABLET ORAL NIGHTLY
Qty: 30 TABLET | Refills: 1 | Status: SHIPPED | OUTPATIENT
Start: 2025-08-06 | End: 2026-08-06

## 2025-08-06 RX ORDER — PAROXETINE 10 MG/1
10 TABLET, FILM COATED ORAL DAILY
COMMUNITY
Start: 2025-05-04 | End: 2025-08-06

## 2025-08-06 RX ORDER — PAROXETINE 20 MG/1
20 TABLET, FILM COATED ORAL EVERY MORNING
Qty: 90 TABLET | Refills: 1 | Status: SHIPPED | OUTPATIENT
Start: 2025-08-06

## 2025-08-06 NOTE — PROGRESS NOTES
Clinic Note    Daja Alfaro is a 40 y.o. female     Chief Complaint:   Chief Complaint   Patient presents with    Insomnia     Adjust meds for insomnia     Depression    Health Maintenance     Hepatitis C Screening refused  HIV Screening refused  TETANUS VACCINE Never done  Mammogram Never done  COVID-19 Vaccine(3 - 2024-25 season) due on 09/01/2024         Subjective:    Patient comes in today for medication refills. Patient states she was going to [facility] for anxiety, depression, and insomnia. States she was referred to counseling but the counselor did not accept her insurance. Patient states if possible she would like meds managed here.   Patient states paxil at nighttime keeps here awake. States she already has issues with insomnia. States she was on paxil in the morning in the past which seemed to work better.     DepressionPatient presents with the following symptoms: nervousness/anxiety.  Patient is not experiencing: palpitations, shortness of breath and suicidal ideas.           Allergies:   Review of patient's allergies indicates:   Allergen Reactions    Aspirin Hives    Bee pollen Hives        Past Medical History:  Past Medical History:   Diagnosis Date    Anemia, unspecified         Current Medications:  Current Medications[1]       Review of Systems   Constitutional:  Negative for fever.   Respiratory:  Negative for cough and shortness of breath.    Cardiovascular:  Negative for chest pain, palpitations and leg swelling.   Gastrointestinal:  Negative for abdominal pain, constipation, diarrhea, nausea and vomiting.   Genitourinary:  Negative for dysuria.   Musculoskeletal:  Positive for arthralgias.   Neurological:  Negative for headaches.   Psychiatric/Behavioral:  Positive for depression, dysphoric mood and sleep disturbance. Negative for self-injury and suicidal ideas. The patient is nervous/anxious.           Objective:    /82 (BP Location: Right arm, Patient Position: Sitting)    "Pulse 84   Temp 98.7 °F (37.1 °C) (Oral)   Resp 20   Ht 5' 8" (1.727 m)   Wt (!) 149.4 kg (329 lb 6.4 oz)   LMP 11/22/2023 (Approximate)   SpO2 100%   BMI 50.09 kg/m²      Physical Exam  Constitutional:       Appearance: Normal appearance. She is obese.   Eyes:      Extraocular Movements: Extraocular movements intact.   Cardiovascular:      Rate and Rhythm: Normal rate and regular rhythm.      Pulses: Normal pulses.      Heart sounds: Normal heart sounds.   Pulmonary:      Effort: Pulmonary effort is normal.      Breath sounds: Normal breath sounds.   Neurological:      Mental Status: She is alert and oriented to person, place, and time.   Psychiatric:         Mood and Affect: Mood normal.          Assessment and Plan:    1. Anxiety    2. Depression, unspecified depression type    3. Insomnia, unspecified type         Anxiety  -     paroxetine (PAXIL) 20 MG tablet; Take 1 tablet (20 mg total) by mouth every morning.  Dispense: 90 tablet; Refill: 1  -change paxil to AM in stead of PM    Depression, unspecified depression type  -     paroxetine (PAXIL) 20 MG tablet; Take 1 tablet (20 mg total) by mouth every morning.  Dispense: 90 tablet; Refill: 1    Insomnia, unspecified type  -     traZODone (DESYREL) 50 MG tablet; Take 1 tablet (50 mg total) by mouth every evening.  Dispense: 30 tablet; Refill: 1  -new med to try for insomnia. Patient thinks maybe she has tried in the past but not positive        There are no Patient Instructions on file for this visit.   Follow up in about 4 weeks (around 9/3/2025).          [1]   Current Outpatient Medications:     acetaminophen (TYLENOL) 500 MG tablet, Take 500 mg by mouth every 6 (six) hours as needed., Disp: , Rfl:     diclofenac sodium (VOLTAREN ARTHRITIS PAIN) 1 % Gel, Apply 2 g topically 4 (four) times daily., Disp: 350 g, Rfl: 0    FEROSUL 325 mg (65 mg iron) Tab tablet, Take by mouth once daily., Disp: , Rfl:     hydrOXYzine pamoate (VISTARIL) 25 MG Cap, Take 25 " mg by mouth nightly as needed., Disp: , Rfl:     mv-min/iron/folic/calcium/vitK (WOMEN'S MULTIVITAMIN ORAL), Take by mouth., Disp: , Rfl:     naproxen (NAPROSYN) 500 MG tablet, Take 1 tablet (500 mg total) by mouth 2 (two) times daily as needed., Disp: 30 tablet, Rfl: 0    paroxetine (PAXIL) 20 MG tablet, Take 1 tablet (20 mg total) by mouth every morning., Disp: 90 tablet, Rfl: 1    traZODone (DESYREL) 50 MG tablet, Take 1 tablet (50 mg total) by mouth every evening., Disp: 30 tablet, Rfl: 1

## 2025-08-07 PROBLEM — F41.9 ANXIETY: Status: ACTIVE | Noted: 2025-08-07

## 2025-08-07 PROBLEM — F32.A DEPRESSION: Status: ACTIVE | Noted: 2025-08-07

## 2025-08-07 PROBLEM — G47.00 INSOMNIA: Status: ACTIVE | Noted: 2025-08-07

## (undated) DEVICE — GOWN NONREINF SET-IN SLV 2XL

## (undated) DEVICE — TRAY CATH FOL SIL URIMTR 16FR

## (undated) DEVICE — SOL NACL IRR 3000ML

## (undated) DEVICE — GLOVE PROTEXIS PI SYN SURG 6.0

## (undated) DEVICE — DISSECTOR KITTNER 5MM T 45CM S

## (undated) DEVICE — KIT GYN LAPAROSCOPY RUSH

## (undated) DEVICE — ELECTRODE LAPSCP L HOOK 36CM

## (undated) DEVICE — SUT VICRYL PLUS 1 CTX 36IN

## (undated) DEVICE — FUSION DEVICE VOYANT BLUNT TIP 5MMX37CM

## (undated) DEVICE — GLOVE 6.0 PROTEXIS PI BLUE

## (undated) DEVICE — MANIPULATOR UTERINE 3.5CM

## (undated) DEVICE — GLOVE PROTEXIS PI SYN SURG 7

## (undated) DEVICE — GLOVE 7.0 PROTEXIS PI BLUE

## (undated) DEVICE — SUT 0 VICRYL / UR6 (J603)

## (undated) DEVICE — IRRIGATOR ENDOSCOPY DISP.

## (undated) DEVICE — ADHESIVE DERMABOND ADVANCED

## (undated) DEVICE — SYR 50CC LL

## (undated) DEVICE — WARMER BLUE HEAT SCOPE 3-12MM

## (undated) DEVICE — ENDOSTITCH INSTRUMENT

## (undated) DEVICE — OCCLUDER COLPO-PNEUMO STERILE

## (undated) DEVICE — SOL NACL IRR 1000ML BTL

## (undated) DEVICE — SET CYSTO IRR DRP CHMBR 84IN

## (undated) DEVICE — APPLICATOR FLOSEAL ENDO 35CM

## (undated) DEVICE — GLOVE PROTEXIS PI SYN SURG 7.5

## (undated) DEVICE — GELPOINT MINI KIT ENDOSCOPIC

## (undated) DEVICE — GLOVE PROTEXIS PI SYN SURG 6.5

## (undated) DEVICE — RELOAD V-LOC 180 0 8IN/20CM

## (undated) DEVICE — SUT 4-0 VICRYL / FS-2

## (undated) DEVICE — MATRIX HEMOSTATIC FLOSEAL 5ML